# Patient Record
Sex: MALE | URBAN - METROPOLITAN AREA
[De-identification: names, ages, dates, MRNs, and addresses within clinical notes are randomized per-mention and may not be internally consistent; named-entity substitution may affect disease eponyms.]

---

## 2017-04-04 DIAGNOSIS — E11.9 CONTROLLED TYPE 2 DIABETES MELLITUS WITHOUT COMPLICATION, WITHOUT LONG-TERM CURRENT USE OF INSULIN (HCC): ICD-10-CM

## 2017-04-04 DIAGNOSIS — Z00.00 HEALTH CARE MAINTENANCE: Primary | ICD-10-CM

## 2017-04-04 RX ORDER — LISINOPRIL AND HYDROCHLOROTHIAZIDE 12.5; 1 MG/1; MG/1
TABLET ORAL
Qty: 90 TABLET | Refills: 0 | Status: SHIPPED | OUTPATIENT
Start: 2017-04-04 | End: 2017-07-03

## 2017-04-04 RX ORDER — ATENOLOL 25 MG/1
TABLET ORAL
Qty: 90 TABLET | Refills: 0 | Status: SHIPPED | OUTPATIENT
Start: 2017-04-04 | End: 2017-07-03

## 2017-04-15 ENCOUNTER — OFFICE VISIT (OUTPATIENT)
Dept: FAMILY MEDICINE CLINIC | Facility: CLINIC | Age: 57
End: 2017-04-15

## 2017-04-15 VITALS
DIASTOLIC BLOOD PRESSURE: 98 MMHG | HEIGHT: 70.25 IN | HEART RATE: 76 BPM | TEMPERATURE: 99 F | SYSTOLIC BLOOD PRESSURE: 158 MMHG | BODY MASS INDEX: 37.37 KG/M2 | WEIGHT: 261 LBS | RESPIRATION RATE: 16 BRPM

## 2017-04-15 DIAGNOSIS — L30.9 DERMATITIS: Primary | ICD-10-CM

## 2017-04-15 PROCEDURE — 99214 OFFICE O/P EST MOD 30 MIN: CPT | Performed by: FAMILY MEDICINE

## 2017-04-15 RX ORDER — PREDNISONE 10 MG/1
TABLET ORAL
Qty: 30 TABLET | Refills: 0 | Status: SHIPPED | OUTPATIENT
Start: 2017-04-15 | End: 2017-08-19 | Stop reason: ALTCHOICE

## 2017-04-15 RX ORDER — IBUPROFEN 200 MG
200 TABLET ORAL EVERY 6 HOURS PRN
COMMUNITY

## 2017-04-15 RX ORDER — CLOBETASOL PROPIONATE 0.5 MG/G
0.05 CREAM TOPICAL 2 TIMES DAILY
COMMUNITY
End: 2017-08-19 | Stop reason: ALTCHOICE

## 2017-04-15 RX ORDER — DIPHENHYDRAMINE HCL 25 MG
25 CAPSULE ORAL EVERY 6 HOURS PRN
COMMUNITY
End: 2017-08-19 | Stop reason: ALTCHOICE

## 2017-04-15 NOTE — PATIENT INSTRUCTIONS
Start tapering dose steroid. - script sent to [facility]     Stop ibuprofen while taking steroid. Tylenol ES  1 - 2 tabs three times a day as needed for pain ( max.  6 per day)

## 2017-04-16 NOTE — PROGRESS NOTES
Chief Complaint:   Patient presents with:  Rash: facial swelling and rash, chills      HPI:   This is a 61year old male coming in for rash on face and scalp   Discomfort   Present for 6 days.    No fever       No results found for this or any previous visi nausea, vomiting, constipation, diarrhea  : denies dysuria, no urinary frequency , no discharge. MUSCULOSKELETAL:  Denies weakness, muscle aches,   NEUROLOGICAL:  Denies headache, dizziness,   HEMATOLOGIC:  Denies bleeding or bruising.   PSYCHIATRIC:  Gladys Lank for 3 days,One tab qd for 3 days. Patient Instructions   Start tapering dose steroid. - script sent to [facility]     Stop ibuprofen while taking steroid. Tylenol ES  1 - 2 tabs three times a day as needed for pain ( max.  6 per day)

## 2017-07-03 ENCOUNTER — TELEPHONE (OUTPATIENT)
Dept: FAMILY MEDICINE CLINIC | Facility: CLINIC | Age: 57
End: 2017-07-03

## 2017-07-03 RX ORDER — LISINOPRIL AND HYDROCHLOROTHIAZIDE 12.5; 1 MG/1; MG/1
TABLET ORAL
Qty: 90 TABLET | Refills: 0 | Status: SHIPPED | OUTPATIENT
Start: 2017-07-03 | End: 2017-09-29

## 2017-07-03 RX ORDER — ATENOLOL 25 MG/1
TABLET ORAL
Qty: 90 TABLET | Refills: 0 | Status: SHIPPED | OUTPATIENT
Start: 2017-07-03 | End: 2017-09-29

## 2017-07-03 NOTE — TELEPHONE ENCOUNTER
Last OV:  4/15/2017  Future Appointments  Date Time Provider Peña Esteban   7/15/2017 8:00 AM REF [place] [facility] Ref Smove, 07/03/17, 3:39 PM

## 2017-07-03 NOTE — TELEPHONE ENCOUNTER
Future Appointments  Date Time Provider Peña Esteban   7/15/2017 8:00 AM REF [place] [facility]     Pt already has appt.

## 2017-07-15 ENCOUNTER — LABORATORY ENCOUNTER (OUTPATIENT)
Dept: LAB | Age: 57
End: 2017-07-15
Attending: FAMILY MEDICINE
Payer: COMMERCIAL

## 2017-07-15 DIAGNOSIS — E11.9 CONTROLLED TYPE 2 DIABETES MELLITUS WITHOUT COMPLICATION, WITHOUT LONG-TERM CURRENT USE OF INSULIN (HCC): ICD-10-CM

## 2017-07-15 DIAGNOSIS — Z00.00 HEALTH CARE MAINTENANCE: ICD-10-CM

## 2017-07-15 LAB
ALBUMIN SERPL-MCNC: 3.4 G/DL (ref 3.5–4.8)
ALP LIVER SERPL-CCNC: 125 U/L (ref 45–117)
ALT SERPL-CCNC: 34 U/L (ref 17–63)
AST SERPL-CCNC: 16 U/L (ref 15–41)
BASOPHILS # BLD AUTO: 0.07 X10(3) UL (ref 0–0.1)
BASOPHILS NFR BLD AUTO: 1.1 %
BILIRUB SERPL-MCNC: 0.7 MG/DL (ref 0.1–2)
BILIRUB UR QL STRIP.AUTO: NEGATIVE
BUN BLD-MCNC: 26 MG/DL (ref 8–20)
CALCIUM BLD-MCNC: 8.9 MG/DL (ref 8.3–10.3)
CHLORIDE: 101 MMOL/L (ref 101–111)
CHOLEST SMN-MCNC: 289 MG/DL (ref ?–200)
CLARITY UR REFRACT.AUTO: CLEAR
CO2: 26 MMOL/L (ref 22–32)
COLOR UR AUTO: YELLOW
COMPLEXED PSA SERPL-MCNC: 1.82 NG/ML (ref 0.01–4)
CREAT BLD-MCNC: 1.03 MG/DL (ref 0.7–1.3)
CREAT UR-SCNC: 89.8 MG/DL
EOSINOPHIL # BLD AUTO: 0.3 X10(3) UL (ref 0–0.3)
EOSINOPHIL NFR BLD AUTO: 4.8 %
ERYTHROCYTE [DISTWIDTH] IN BLOOD BY AUTOMATED COUNT: 12.9 % (ref 11.5–16)
EST. AVERAGE GLUCOSE BLD GHB EST-MCNC: 278 MG/DL (ref 68–126)
GLUCOSE BLD-MCNC: 273 MG/DL (ref 70–99)
GLUCOSE UR STRIP.AUTO-MCNC: >=500 MG/DL
HBA1C MFR BLD HPLC: 11.3 % (ref ?–5.7)
HCT VFR BLD AUTO: 47.2 % (ref 37–53)
HDLC SERPL-MCNC: 57 MG/DL (ref 45–?)
HDLC SERPL: 5.07 {RATIO} (ref ?–4.97)
HGB BLD-MCNC: 15.1 G/DL (ref 13–17)
IMMATURE GRANULOCYTE COUNT: 0.05 X10(3) UL (ref 0–1)
IMMATURE GRANULOCYTE RATIO %: 0.8 %
LDLC SERPL CALC-MCNC: 191 MG/DL (ref ?–130)
LEUKOCYTE ESTERASE UR QL STRIP.AUTO: NEGATIVE
LYMPHOCYTES # BLD AUTO: 1.42 X10(3) UL (ref 0.9–4)
LYMPHOCYTES NFR BLD AUTO: 22.6 %
M PROTEIN MFR SERPL ELPH: 7 G/DL (ref 6.1–8.3)
MCH RBC QN AUTO: 29.7 PG (ref 27–33.2)
MCHC RBC AUTO-ENTMCNC: 32 G/DL (ref 31–37)
MCV RBC AUTO: 92.9 FL (ref 80–99)
MICROALBUMIN UR-MCNC: <0.5 MG/DL
MONOCYTES # BLD AUTO: 0.49 X10(3) UL (ref 0.1–0.6)
MONOCYTES NFR BLD AUTO: 7.8 %
NEUTROPHIL ABS PRELIM: 3.96 X10 (3) UL (ref 1.3–6.7)
NEUTROPHILS # BLD AUTO: 3.96 X10(3) UL (ref 1.3–6.7)
NEUTROPHILS NFR BLD AUTO: 62.9 %
NITRITE UR QL STRIP.AUTO: NEGATIVE
NONHDLC SERPL-MCNC: 232 MG/DL (ref ?–130)
PH UR STRIP.AUTO: 6 [PH] (ref 4.5–8)
PLATELET # BLD AUTO: 238 10(3)UL (ref 150–450)
POTASSIUM SERPL-SCNC: 4.3 MMOL/L (ref 3.6–5.1)
PROT UR STRIP.AUTO-MCNC: NEGATIVE MG/DL
RBC # BLD AUTO: 5.08 X10(6)UL (ref 4.3–5.7)
RBC UR QL AUTO: NEGATIVE
RED CELL DISTRIBUTION WIDTH-SD: 43.9 FL (ref 35.1–46.3)
SODIUM SERPL-SCNC: 138 MMOL/L (ref 136–144)
SP GR UR STRIP.AUTO: 1.03 (ref 1–1.03)
TRIGLYCERIDES: 204 MG/DL (ref ?–150)
TSI SER-ACNC: 2.33 MIU/ML (ref 0.35–5.5)
UROBILINOGEN UR STRIP.AUTO-MCNC: <2 MG/DL
VLDL: 41 MG/DL (ref 5–40)
WBC # BLD AUTO: 6.3 X10(3) UL (ref 4–13)

## 2017-07-15 PROCEDURE — 81003 URINALYSIS AUTO W/O SCOPE: CPT | Performed by: FAMILY MEDICINE

## 2017-07-15 PROCEDURE — 82043 UR ALBUMIN QUANTITATIVE: CPT | Performed by: FAMILY MEDICINE

## 2017-07-15 PROCEDURE — 84153 ASSAY OF PSA TOTAL: CPT | Performed by: FAMILY MEDICINE

## 2017-07-15 PROCEDURE — 80061 LIPID PANEL: CPT | Performed by: FAMILY MEDICINE

## 2017-07-15 PROCEDURE — 36415 COLL VENOUS BLD VENIPUNCTURE: CPT | Performed by: FAMILY MEDICINE

## 2017-07-15 PROCEDURE — 82570 ASSAY OF URINE CREATININE: CPT | Performed by: FAMILY MEDICINE

## 2017-07-15 PROCEDURE — 83036 HEMOGLOBIN GLYCOSYLATED A1C: CPT | Performed by: FAMILY MEDICINE

## 2017-07-15 PROCEDURE — 80050 GENERAL HEALTH PANEL: CPT | Performed by: FAMILY MEDICINE

## 2017-07-17 ENCOUNTER — TELEPHONE (OUTPATIENT)
Dept: FAMILY MEDICINE CLINIC | Facility: CLINIC | Age: 57
End: 2017-07-17

## 2017-07-17 NOTE — TELEPHONE ENCOUNTER
Informed pt of his blood work results. Advised pt to watch his diet and sugar. Schedule appt with Dr. Fredo Michael for 8/19/17 to discuss blood work results. Pt needed a Saturday and a 8:00 appt. Pt expressed understanding and thanks.

## 2017-07-17 NOTE — TELEPHONE ENCOUNTER
----- Message from Miles Hma MD sent at 7/17/2017  7:03 AM CDT -----  Labs are normal with exception of an elevated cholesterol, FBS of 273 and an A1c of 11.3. The A1c of 11.3 indicates very poorly controlled diabetes.   Patient needs an appointment

## 2017-08-19 ENCOUNTER — OFFICE VISIT (OUTPATIENT)
Dept: FAMILY MEDICINE CLINIC | Facility: CLINIC | Age: 57
End: 2017-08-19

## 2017-08-19 VITALS
WEIGHT: 263.13 LBS | TEMPERATURE: 97 F | RESPIRATION RATE: 12 BRPM | BODY MASS INDEX: 38.97 KG/M2 | SYSTOLIC BLOOD PRESSURE: 142 MMHG | DIASTOLIC BLOOD PRESSURE: 98 MMHG | HEART RATE: 78 BPM | HEIGHT: 69 IN

## 2017-08-19 DIAGNOSIS — E11.65 UNCONTROLLED TYPE 2 DIABETES MELLITUS WITH COMPLICATION, WITHOUT LONG-TERM CURRENT USE OF INSULIN (HCC): Primary | ICD-10-CM

## 2017-08-19 DIAGNOSIS — E11.8 UNCONTROLLED TYPE 2 DIABETES MELLITUS WITH COMPLICATION, WITHOUT LONG-TERM CURRENT USE OF INSULIN (HCC): Primary | ICD-10-CM

## 2017-08-19 PROCEDURE — 99214 OFFICE O/P EST MOD 30 MIN: CPT | Performed by: FAMILY MEDICINE

## 2017-08-19 NOTE — PATIENT INSTRUCTIONS
Highly recommend weight loss and improved diet along with taking medication for diabetes, cholesterol, and hypertension.   Highly recommend cardiac stress test.

## 2017-08-19 NOTE — PROGRESS NOTES
[facility] [place]  PROGRESS NOTE  Chief Complaint:   Patient presents with:  Lab Results      HPI:   This is a 61year old male coming in for discussion of labs. Patient has a history of hypertension was seen last year for hypertension.   Has n <=30.0 ug/mg   -URINALYSIS, ROUTINE   Result Value Ref Range   Urine Color Yellow Yellow   Clarity Urine Clear Clear   Spec Gravity 1.026 1.001 - 1.030   Glucose Urine >=500 (A) Negative mg/dl   Bilirubin Urine Negative Negative   Ketones Urine Trace (A) N History   Problem Relation Age of Onset   • Cerebral hemorrhage [OTHER] Mother       at 67 of cerebral hemorrhage, had diabetes and CAD   • CAD [OTHER] Father      Other  in his upper [de-identified] with CAD, having CABG at age 80     Allergies:    No Known A Patient is alert, awake and oriented, well developed, well nourished  NECK: Supple, no JVD, no thyromegaly. SKIN: No rashes, no skin lesion, no bruising, good turgor. HEART:  Regular rate and rhythm, no murmurs, rubs or gallops.   LUNGS: Clear to ausculta Eye Exam due on 01/17/1959  Annual Physical due on 01/17/1961  FIT Colorectal Screening due on 01/17/2009  Colonoscopy,10 Years due on 01/17/2009    Patient/Caregiver Education: Patient/Caregiver Education: There are no barriers to learning.  Medical educat

## 2017-09-29 RX ORDER — ATENOLOL 25 MG/1
TABLET ORAL
Qty: 90 TABLET | Refills: 0 | Status: SHIPPED | OUTPATIENT
Start: 2017-09-29 | End: 2017-12-30

## 2017-09-29 RX ORDER — LISINOPRIL AND HYDROCHLOROTHIAZIDE 12.5; 1 MG/1; MG/1
TABLET ORAL
Qty: 90 TABLET | Refills: 0 | Status: SHIPPED | OUTPATIENT
Start: 2017-09-29 | End: 2017-12-30

## 2017-09-29 NOTE — TELEPHONE ENCOUNTER
Future appt:  None  Last Appointment:  8/19/2017; No f/u recommendations    Cholesterol, Total (mg/dL)   Date Value   07/15/2017 289 (H)   ----------  HDL Cholesterol (mg/dL)   Date Value   07/15/2017 57   ----------  LDL Cholesterol (mg/dL)   Date Value

## 2018-01-02 RX ORDER — LISINOPRIL AND HYDROCHLOROTHIAZIDE 12.5; 1 MG/1; MG/1
TABLET ORAL
Qty: 90 TABLET | Refills: 0 | Status: SHIPPED | OUTPATIENT
Start: 2018-01-02 | End: 2018-03-27

## 2018-01-02 RX ORDER — ATENOLOL 25 MG/1
TABLET ORAL
Qty: 90 TABLET | Refills: 0 | Status: SHIPPED | OUTPATIENT
Start: 2018-01-02 | End: 2018-03-27

## 2018-01-02 NOTE — TELEPHONE ENCOUNTER
Future appt:    Last Appointment:  8/19/2017 with Dr. Jadiel Bull for DM    Cholesterol, Total (mg/dL)   Date Value   07/15/2017 289 (H)   ----------  HDL Cholesterol (mg/dL)   Date Value   07/15/2017 57   ----------  LDL Cholesterol (mg/dL)   Date Value   07

## 2018-03-27 RX ORDER — ATENOLOL 25 MG/1
TABLET ORAL
Qty: 90 TABLET | Refills: 0 | Status: SHIPPED | OUTPATIENT
Start: 2018-03-27 | End: 2018-06-29

## 2018-03-27 RX ORDER — LISINOPRIL AND HYDROCHLOROTHIAZIDE 12.5; 1 MG/1; MG/1
TABLET ORAL
Qty: 90 TABLET | Refills: 0 | Status: SHIPPED | OUTPATIENT
Start: 2018-03-27 | End: 2018-06-29

## 2018-03-27 NOTE — TELEPHONE ENCOUNTER
Patient informed He is overdue for an Appt with Dr Yaneli French. Appt given Saturday May 5th 8:30 with Dr Yaneli French. Savage Chavarria, 03/27/18, 10:29 AM    Refill Atenolol 25mg 1 tab po daily #90  Lisinopril/HCTZ 10/12.5mg 1 po daily. #90    Future appt:     Celso Hodgkin

## 2018-03-27 NOTE — TELEPHONE ENCOUNTER
Future appt:    Last Appointment:  Visit date not found    Cholesterol, Total (mg/dL)   Date Value   07/15/2017 289 (H)   ----------  HDL Cholesterol (mg/dL)   Date Value   07/15/2017 57   ----------  LDL Cholesterol (mg/dL)   Date Value   07/15/2017 191 (

## 2018-05-05 ENCOUNTER — APPOINTMENT (OUTPATIENT)
Dept: LAB | Age: 58
End: 2018-05-05
Attending: FAMILY MEDICINE
Payer: COMMERCIAL

## 2018-05-05 ENCOUNTER — OFFICE VISIT (OUTPATIENT)
Dept: FAMILY MEDICINE CLINIC | Facility: CLINIC | Age: 58
End: 2018-05-05

## 2018-05-05 VITALS
SYSTOLIC BLOOD PRESSURE: 148 MMHG | HEIGHT: 69 IN | BODY MASS INDEX: 38.95 KG/M2 | RESPIRATION RATE: 18 BRPM | TEMPERATURE: 97 F | HEART RATE: 78 BPM | DIASTOLIC BLOOD PRESSURE: 98 MMHG | WEIGHT: 263 LBS

## 2018-05-05 DIAGNOSIS — E78.49 FAMILIAL MULTIPLE LIPOPROTEIN-TYPE HYPERLIPIDEMIA: ICD-10-CM

## 2018-05-05 DIAGNOSIS — E11.8 UNCONTROLLED TYPE 2 DIABETES MELLITUS WITH COMPLICATION, WITHOUT LONG-TERM CURRENT USE OF INSULIN (HCC): Primary | ICD-10-CM

## 2018-05-05 DIAGNOSIS — I10 BENIGN ESSENTIAL HYPERTENSION: ICD-10-CM

## 2018-05-05 DIAGNOSIS — E11.65 UNCONTROLLED TYPE 2 DIABETES MELLITUS WITH COMPLICATION, WITHOUT LONG-TERM CURRENT USE OF INSULIN (HCC): Primary | ICD-10-CM

## 2018-05-05 PROCEDURE — 83036 HEMOGLOBIN GLYCOSYLATED A1C: CPT | Performed by: FAMILY MEDICINE

## 2018-05-05 PROCEDURE — 36415 COLL VENOUS BLD VENIPUNCTURE: CPT | Performed by: FAMILY MEDICINE

## 2018-05-05 PROCEDURE — 80048 BASIC METABOLIC PNL TOTAL CA: CPT | Performed by: FAMILY MEDICINE

## 2018-05-05 PROCEDURE — 99213 OFFICE O/P EST LOW 20 MIN: CPT | Performed by: FAMILY MEDICINE

## 2018-05-05 NOTE — PROGRESS NOTES
[address]  PROGRESS NOTE  Chief Complaint:   Patient presents with: Follow - Up: med check      HPI:   This is a 60year old male coming in for renewal of his blood pressure medication.   This is been a difficult patient to deal with as Result Value Ref Range   Microalbumin, Urine <0.50 mg/dL   Creatinine Ur Random 89.80 mg/dL   Malb/Cre Calc  <=30.0 ug/mg   -URINALYSIS, ROUTINE   Result Value Ref Range   Urine Color Yellow Yellow   Clarity Urine Clear Clear   Spec Gravity 1.026 1.001 - Alcohol use: Yes           0.0 oz/week     Comment: Twice a month a glass of wine    Family History:  Family History   Problem Relation Age of Onset   • Cerebral hemorrhage [OTHER] Mother       at 67 of cerebral hemorrhage, had diabetes and CAD   • C long-term current use of insulin (HCC)  -     BASIC METABOLIC PANEL (8); Future  -     HEMOGLOBIN A1C; Future    Benign essential hypertension  -     BASIC METABOLIC PANEL (8);  Future  -     HEMOGLOBIN A1C; Future    Familial multiple lipoprotein-type hype

## 2018-05-07 ENCOUNTER — TELEPHONE (OUTPATIENT)
Dept: FAMILY MEDICINE CLINIC | Facility: CLINIC | Age: 58
End: 2018-05-07

## 2018-05-07 NOTE — TELEPHONE ENCOUNTER
Patient notified of results and recommendations and expressed understanding.   Discussed with patient what an Endocrinologist is  Patient advised he just needs to let us know what he wants to do  Patient states \"OK\" and hung up    Ky Cerda, 05/07/18,

## 2018-05-07 NOTE — TELEPHONE ENCOUNTER
----- Message from Deana Ocampo MD sent at 5/7/2018  6:52 AM CDT -----  Glucose is 303 with an A1c of 10.7 which indicates uncontrolled diabetes.   I recommend either starting medication or seeing endocrinologist.  Patient has refused to start any diabe

## 2018-06-29 ENCOUNTER — TELEPHONE (OUTPATIENT)
Dept: FAMILY MEDICINE CLINIC | Facility: CLINIC | Age: 58
End: 2018-06-29

## 2018-06-29 RX ORDER — LISINOPRIL AND HYDROCHLOROTHIAZIDE 12.5; 1 MG/1; MG/1
TABLET ORAL
Qty: 90 TABLET | Refills: 0 | Status: SHIPPED | OUTPATIENT
Start: 2018-06-29 | End: 2018-09-29

## 2018-06-29 RX ORDER — ATENOLOL 25 MG/1
TABLET ORAL
Qty: 90 TABLET | Refills: 0 | Status: SHIPPED | OUTPATIENT
Start: 2018-06-29 | End: 2018-09-29

## 2018-06-29 NOTE — TELEPHONE ENCOUNTER
Informed wife pt will start on metformin bid and recheck a1c 3 months. Wife expressed understanding and thanks.

## 2018-09-29 DIAGNOSIS — E11.9 CONTROLLED TYPE 2 DIABETES MELLITUS WITHOUT COMPLICATION, WITHOUT LONG-TERM CURRENT USE OF INSULIN (HCC): Primary | ICD-10-CM

## 2018-09-29 NOTE — TELEPHONE ENCOUNTER
Future appt:    Last Appointment:  Visit date not found  Cholesterol, Total (mg/dL)   Date Value   07/15/2017 289 (H)     HDL Cholesterol (mg/dL)   Date Value   07/15/2017 57     LDL Cholesterol (mg/dL)   Date Value   07/15/2017 191 (H)     Triglycerides (

## 2018-10-01 RX ORDER — LISINOPRIL AND HYDROCHLOROTHIAZIDE 12.5; 1 MG/1; MG/1
TABLET ORAL
Qty: 90 TABLET | Refills: 0 | Status: SHIPPED | OUTPATIENT
Start: 2018-10-01 | End: 2019-01-03

## 2018-10-01 RX ORDER — ATENOLOL 25 MG/1
TABLET ORAL
Qty: 90 TABLET | Refills: 0 | Status: SHIPPED | OUTPATIENT
Start: 2018-10-01 | End: 2019-01-03

## 2018-12-01 ENCOUNTER — APPOINTMENT (OUTPATIENT)
Dept: LAB | Age: 58
End: 2018-12-01
Attending: FAMILY MEDICINE
Payer: COMMERCIAL

## 2018-12-01 DIAGNOSIS — E11.9 CONTROLLED TYPE 2 DIABETES MELLITUS WITHOUT COMPLICATION, WITHOUT LONG-TERM CURRENT USE OF INSULIN (HCC): ICD-10-CM

## 2018-12-01 PROCEDURE — 83036 HEMOGLOBIN GLYCOSYLATED A1C: CPT | Performed by: FAMILY MEDICINE

## 2018-12-01 PROCEDURE — 80048 BASIC METABOLIC PNL TOTAL CA: CPT | Performed by: FAMILY MEDICINE

## 2018-12-01 PROCEDURE — 36415 COLL VENOUS BLD VENIPUNCTURE: CPT | Performed by: FAMILY MEDICINE

## 2018-12-03 ENCOUNTER — TELEPHONE (OUTPATIENT)
Dept: FAMILY MEDICINE CLINIC | Facility: CLINIC | Age: 58
End: 2018-12-03

## 2018-12-03 DIAGNOSIS — E11.9 TYPE 2 DIABETES MELLITUS WITHOUT COMPLICATION, WITHOUT LONG-TERM CURRENT USE OF INSULIN (HCC): Primary | ICD-10-CM

## 2018-12-03 NOTE — TELEPHONE ENCOUNTER
Patient informed of below. Expressed understanding. Requesting Referal to Dr. Molly Sue.   Bryon Magaña, 12/03/18, 2:16 PM

## 2018-12-03 NOTE — TELEPHONE ENCOUNTER
----- Message from Myrl Lesch, MD sent at 12/3/2018  6:46 AM CST -----  Glucose is 275 with A1c of 10.7 indicating poor control of diabetes. I highly recommend patient to see endocrinologist recommend Dr. Cornelius Matute locally.   Please notify patient

## 2019-01-03 RX ORDER — LISINOPRIL AND HYDROCHLOROTHIAZIDE 12.5; 1 MG/1; MG/1
TABLET ORAL
Qty: 90 TABLET | Refills: 0 | Status: SHIPPED | OUTPATIENT
Start: 2019-01-03 | End: 2019-03-27

## 2019-01-03 RX ORDER — ATENOLOL 25 MG/1
TABLET ORAL
Qty: 90 TABLET | Refills: 0 | Status: SHIPPED | OUTPATIENT
Start: 2019-01-03 | End: 2019-03-27

## 2019-01-03 NOTE — TELEPHONE ENCOUNTER
Please inform patient that prescription is sent. He is due for follow-up office visit, recommend to schedule appointment.

## 2019-01-03 NOTE — TELEPHONE ENCOUNTER
Future appt:    Last Appointment:  5/5/2018; No f/u recommended    Cholesterol, Total (mg/dL)   Date Value   07/15/2017 289 (H)     HDL Cholesterol (mg/dL)   Date Value   07/15/2017 57     LDL Cholesterol (mg/dL)   Date Value   07/15/2017 191 (H)     Trigl

## 2019-01-07 NOTE — TELEPHONE ENCOUNTER
Patient informed of below.   Patient is still making a decision on new MD.  Jess Dolan, 01/07/19, 11:31 AM

## 2019-03-25 NOTE — TELEPHONE ENCOUNTER
Future appt:    Last Appointment:  5/5/2018 with Dr. Dariel Rivero; No f/u recommended    Cholesterol, Total (mg/dL)   Date Value   07/15/2017 289 (H)     HDL Cholesterol (mg/dL)   Date Value   07/15/2017 57     LDL Cholesterol (mg/dL)   Date Value   07/15/2017

## 2019-03-26 RX ORDER — LISINOPRIL AND HYDROCHLOROTHIAZIDE 12.5; 1 MG/1; MG/1
TABLET ORAL
Qty: 90 TABLET | Refills: 0 | OUTPATIENT
Start: 2019-03-26

## 2019-03-26 RX ORDER — ATENOLOL 25 MG/1
TABLET ORAL
Qty: 90 TABLET | Refills: 0 | OUTPATIENT
Start: 2019-03-26

## 2019-03-27 RX ORDER — LISINOPRIL AND HYDROCHLOROTHIAZIDE 12.5; 1 MG/1; MG/1
TABLET ORAL
Qty: 90 TABLET | Refills: 0 | Status: SHIPPED | OUTPATIENT
Start: 2019-03-27 | End: 2019-07-02

## 2019-03-27 RX ORDER — ATENOLOL 25 MG/1
TABLET ORAL
Qty: 90 TABLET | Refills: 0 | Status: SHIPPED | OUTPATIENT
Start: 2019-03-27 | End: 2019-07-02

## 2019-03-27 NOTE — TELEPHONE ENCOUNTER
Patient informed of the below and scheduled follow up appointment with Dr. Genoveva Rivera. 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 Appointment made. Patient refused to come in prior to that appointment for fasting labs.  States he will c

## 2019-03-27 NOTE — TELEPHONE ENCOUNTER
Future appt:     Your appointments     Date & Time Appointment Department [facility])    Apr 13, 2019  9:45 AM CDT Follow up with Nathalie Clayton MD [address] [place] [address]        Myrna Rutledge, Ru Bolden

## 2019-03-27 NOTE — TELEPHONE ENCOUNTER
Chart reviewed     [facility]       Patient has made appointment - can he come in ahead of time for labs - or can he come in fasting?

## 2019-04-13 ENCOUNTER — OFFICE VISIT (OUTPATIENT)
Dept: FAMILY MEDICINE CLINIC | Facility: CLINIC | Age: 59
End: 2019-04-13
Payer: COMMERCIAL

## 2019-04-13 ENCOUNTER — APPOINTMENT (OUTPATIENT)
Dept: LAB | Age: 59
End: 2019-04-13
Attending: FAMILY MEDICINE
Payer: COMMERCIAL

## 2019-04-13 VITALS
HEART RATE: 78 BPM | OXYGEN SATURATION: 93 % | TEMPERATURE: 98 F | SYSTOLIC BLOOD PRESSURE: 140 MMHG | HEIGHT: 69 IN | BODY MASS INDEX: 40.11 KG/M2 | RESPIRATION RATE: 18 BRPM | WEIGHT: 270.81 LBS | DIASTOLIC BLOOD PRESSURE: 98 MMHG

## 2019-04-13 DIAGNOSIS — E11.65 UNCONTROLLED TYPE 2 DIABETES MELLITUS WITH COMPLICATION, WITHOUT LONG-TERM CURRENT USE OF INSULIN (HCC): Primary | ICD-10-CM

## 2019-04-13 DIAGNOSIS — E11.8 UNCONTROLLED TYPE 2 DIABETES MELLITUS WITH COMPLICATION, WITHOUT LONG-TERM CURRENT USE OF INSULIN (HCC): Primary | ICD-10-CM

## 2019-04-13 DIAGNOSIS — I10 BENIGN ESSENTIAL HYPERTENSION: ICD-10-CM

## 2019-04-13 DIAGNOSIS — E11.8 UNCONTROLLED TYPE 2 DIABETES MELLITUS WITH COMPLICATION, WITHOUT LONG-TERM CURRENT USE OF INSULIN (HCC): ICD-10-CM

## 2019-04-13 DIAGNOSIS — E11.65 UNCONTROLLED TYPE 2 DIABETES MELLITUS WITH COMPLICATION, WITHOUT LONG-TERM CURRENT USE OF INSULIN (HCC): ICD-10-CM

## 2019-04-13 PROCEDURE — 83036 HEMOGLOBIN GLYCOSYLATED A1C: CPT | Performed by: FAMILY MEDICINE

## 2019-04-13 PROCEDURE — 80053 COMPREHEN METABOLIC PANEL: CPT | Performed by: FAMILY MEDICINE

## 2019-04-13 PROCEDURE — 36415 COLL VENOUS BLD VENIPUNCTURE: CPT | Performed by: FAMILY MEDICINE

## 2019-04-13 PROCEDURE — 99214 OFFICE O/P EST MOD 30 MIN: CPT | Performed by: FAMILY MEDICINE

## 2019-04-13 NOTE — PATIENT INSTRUCTIONS
Continue with medications    Check labs today     Anticipate recheck in 3 - 4 months.      Continue with good work with healthy nutrition and regular activity

## 2019-07-02 RX ORDER — LISINOPRIL AND HYDROCHLOROTHIAZIDE 12.5; 1 MG/1; MG/1
TABLET ORAL
Qty: 90 TABLET | Refills: 1 | Status: SHIPPED | OUTPATIENT
Start: 2019-07-02 | End: 2020-01-03

## 2019-07-02 RX ORDER — ATENOLOL 25 MG/1
TABLET ORAL
Qty: 90 TABLET | Refills: 1 | Status: SHIPPED | OUTPATIENT
Start: 2019-07-02 | End: 2020-01-03

## 2019-07-02 NOTE — TELEPHONE ENCOUNTER
Future appt:    Last Appointment:  4/13/2019 establish care; recheck 3-4 months  Cholesterol, Total (mg/dL)   Date Value   07/15/2017 289 (H)     HDL Cholesterol (mg/dL)   Date Value   07/15/2017 57     LDL Cholesterol (mg/dL)   Date Value   07/15/2017 191 (H)     Triglycerides (mg/dL)   Date Value   07/15/2017 204 (H)     Lab Results   Component Value Date     (H) 04/13/2019    A1C 11.2 (H) 04/13/2019     Lab Results   Component Value Date    TSH 2.330 07/15/2017       No follow-ups on file.      Last RF: 3/27/19

## 2020-01-03 ENCOUNTER — TELEPHONE (OUTPATIENT)
Dept: FAMILY MEDICINE CLINIC | Facility: CLINIC | Age: 60
End: 2020-01-03

## 2020-01-03 RX ORDER — ATENOLOL 25 MG/1
TABLET ORAL
Qty: 90 TABLET | Refills: 0 | Status: SHIPPED | OUTPATIENT
Start: 2020-01-03 | End: 2020-03-25

## 2020-01-03 RX ORDER — LISINOPRIL AND HYDROCHLOROTHIAZIDE 12.5; 1 MG/1; MG/1
TABLET ORAL
Qty: 90 TABLET | Refills: 0 | Status: SHIPPED | OUTPATIENT
Start: 2020-01-03 | End: 2020-03-25

## 2020-01-03 NOTE — TELEPHONE ENCOUNTER
Recommend patient go to Emergency Room   Patient may need some testing done promptly that we are not capable of doing in office setting.

## 2020-01-03 NOTE — TELEPHONE ENCOUNTER
Pt states that he feels \"weird, jittery\" today. He checked his bp and it was 180/117. He states that he is lightheaded. He denies any cp/sob or headache. He would like to be seen today. Please advise.

## 2020-01-03 NOTE — TELEPHONE ENCOUNTER
Patient informed of the below and scheduled appointment with Dr. Arvind Buckner. Patient can only come on Saturdays due to work. Patient will come fasting for bloodwork after appointment. Patient only has one tablet left of medications. Please advise.

## 2020-01-03 NOTE — TELEPHONE ENCOUNTER
Patient is due to recheck with Dr. Martita Okeefe and recheck labs.      Future appt:    Last Appointment with provider: 4/13/19 DM f/u; recheck 3-4 months  Last appointment at [facility] [place]:  Visit date not found  Cholesterol, Total (mg/dL)   Date Value   07/15/2017 2

## 2020-01-03 NOTE — TELEPHONE ENCOUNTER
Patient informed of the below. States he got his blood pressure to come down. It is now 155/98 and he feels fine. States he had a lot of coffee this morning and some candy so he feels the elevated blood pressure was a result of that.      Patient ian

## 2020-01-18 ENCOUNTER — OFFICE VISIT (OUTPATIENT)
Dept: FAMILY MEDICINE CLINIC | Facility: CLINIC | Age: 60
End: 2020-01-18
Payer: COMMERCIAL

## 2020-01-18 ENCOUNTER — APPOINTMENT (OUTPATIENT)
Dept: LAB | Age: 60
End: 2020-01-18
Attending: FAMILY MEDICINE
Payer: COMMERCIAL

## 2020-01-18 VITALS
BODY MASS INDEX: 37.37 KG/M2 | HEART RATE: 74 BPM | HEIGHT: 70 IN | WEIGHT: 261 LBS | RESPIRATION RATE: 14 BRPM | DIASTOLIC BLOOD PRESSURE: 86 MMHG | SYSTOLIC BLOOD PRESSURE: 142 MMHG | TEMPERATURE: 98 F

## 2020-01-18 DIAGNOSIS — I10 BENIGN HYPERTENSION: ICD-10-CM

## 2020-01-18 DIAGNOSIS — E11.9 TYPE 2 DIABETES MELLITUS WITHOUT COMPLICATION, WITHOUT LONG-TERM CURRENT USE OF INSULIN (HCC): ICD-10-CM

## 2020-01-18 DIAGNOSIS — E11.9 TYPE 2 DIABETES MELLITUS WITHOUT COMPLICATION, WITHOUT LONG-TERM CURRENT USE OF INSULIN (HCC): Primary | ICD-10-CM

## 2020-01-18 LAB
ALBUMIN SERPL-MCNC: 3.4 G/DL (ref 3.4–5)
ALBUMIN/GLOB SERPL: 0.8 {RATIO} (ref 1–2)
ALP LIVER SERPL-CCNC: 152 U/L (ref 45–117)
ALT SERPL-CCNC: 30 U/L (ref 16–61)
ANION GAP SERPL CALC-SCNC: 3 MMOL/L (ref 0–18)
AST SERPL-CCNC: 17 U/L (ref 15–37)
BILIRUB SERPL-MCNC: 0.6 MG/DL (ref 0.1–2)
BUN BLD-MCNC: 17 MG/DL (ref 7–18)
BUN/CREAT SERPL: 20.2 (ref 10–20)
CALCIUM BLD-MCNC: 8.8 MG/DL (ref 8.5–10.1)
CHLORIDE SERPL-SCNC: 105 MMOL/L (ref 98–112)
CHOLEST SMN-MCNC: 252 MG/DL (ref ?–200)
CO2 SERPL-SCNC: 28 MMOL/L (ref 21–32)
CREAT BLD-MCNC: 0.84 MG/DL (ref 0.7–1.3)
EST. AVERAGE GLUCOSE BLD GHB EST-MCNC: 295 MG/DL (ref 68–126)
GLOBULIN PLAS-MCNC: 4.2 G/DL (ref 2.8–4.4)
GLUCOSE BLD-MCNC: 259 MG/DL (ref 70–99)
HBA1C MFR BLD HPLC: 11.9 % (ref ?–5.7)
HDLC SERPL-MCNC: 51 MG/DL (ref 40–59)
LDLC SERPL CALC-MCNC: 162 MG/DL (ref ?–100)
M PROTEIN MFR SERPL ELPH: 7.6 G/DL (ref 6.4–8.2)
NONHDLC SERPL-MCNC: 201 MG/DL (ref ?–130)
OSMOLALITY SERPL CALC.SUM OF ELEC: 292 MOSM/KG (ref 275–295)
PATIENT FASTING Y/N/NP: YES
PATIENT FASTING Y/N/NP: YES
POTASSIUM SERPL-SCNC: 4.2 MMOL/L (ref 3.5–5.1)
SODIUM SERPL-SCNC: 136 MMOL/L (ref 136–145)
TRIGL SERPL-MCNC: 197 MG/DL (ref 30–149)
VLDLC SERPL CALC-MCNC: 39 MG/DL (ref 0–30)

## 2020-01-18 PROCEDURE — 80053 COMPREHEN METABOLIC PANEL: CPT | Performed by: FAMILY MEDICINE

## 2020-01-18 PROCEDURE — 83036 HEMOGLOBIN GLYCOSYLATED A1C: CPT | Performed by: FAMILY MEDICINE

## 2020-01-18 PROCEDURE — 99214 OFFICE O/P EST MOD 30 MIN: CPT | Performed by: FAMILY MEDICINE

## 2020-01-18 PROCEDURE — 36415 COLL VENOUS BLD VENIPUNCTURE: CPT | Performed by: FAMILY MEDICINE

## 2020-01-18 PROCEDURE — 80061 LIPID PANEL: CPT | Performed by: FAMILY MEDICINE

## 2020-01-18 NOTE — PATIENT INSTRUCTIONS
Good exam       Obtain labs today     [id number] for refills.    Consider doubling dose of lisinopril       Recheck in 6 months

## 2020-01-21 ENCOUNTER — TELEPHONE (OUTPATIENT)
Dept: FAMILY MEDICINE CLINIC | Facility: CLINIC | Age: 60
End: 2020-01-21

## 2020-01-21 DIAGNOSIS — E11.65 UNCONTROLLED TYPE 2 DIABETES MELLITUS WITH COMPLICATION, WITHOUT LONG-TERM CURRENT USE OF INSULIN (HCC): Primary | ICD-10-CM

## 2020-01-21 DIAGNOSIS — E11.8 UNCONTROLLED TYPE 2 DIABETES MELLITUS WITH COMPLICATION, WITHOUT LONG-TERM CURRENT USE OF INSULIN (HCC): Primary | ICD-10-CM

## 2020-01-21 RX ORDER — GLIMEPIRIDE 2 MG/1
2 TABLET ORAL
Qty: 90 TABLET | Refills: 0 | Status: SHIPPED | OUTPATIENT
Start: 2020-01-21 | End: 2020-03-25

## 2020-01-21 NOTE — TELEPHONE ENCOUNTER
Patient informed of the below results and recommendations from Dr. Chelsea Mae. Patient is agreeable to starting glimepiride and requests a 90 day supply be sent to [facility].      Informed patient I would send in prescription and reminded him that when he is abo

## 2020-01-21 NOTE — TELEPHONE ENCOUNTER
----- Message from Geneva Montaño MD sent at 1/20/2020  4:31 PM CST -----  Labs reviewed. Blood sugar elevated at 259. Hemoglobin A1c at 11.9. Discussed with patient and his evaluation regarding cost of medicines.   Recommend start glimepiride at 2

## 2020-03-25 RX ORDER — LISINOPRIL AND HYDROCHLOROTHIAZIDE 12.5; 1 MG/1; MG/1
TABLET ORAL
Qty: 90 TABLET | Refills: 1 | Status: SHIPPED | OUTPATIENT
Start: 2020-03-25 | End: 2020-09-21

## 2020-03-25 RX ORDER — ATENOLOL 25 MG/1
TABLET ORAL
Qty: 90 TABLET | Refills: 1 | Status: SHIPPED | OUTPATIENT
Start: 2020-03-25 | End: 2020-09-21

## 2020-03-25 RX ORDER — GLIMEPIRIDE 2 MG/1
TABLET ORAL
Qty: 90 TABLET | Refills: 1 | Status: SHIPPED | OUTPATIENT
Start: 2020-03-25 | End: 2020-09-21

## 2020-03-25 NOTE — TELEPHONE ENCOUNTER
Future appt:    Last Appointment with provider:   1/18/2020; Recheck in 6 months     Last appointment at [facility] [place]:  1/18/2020  Cholesterol, Total (mg/dL)   Date Value   01/18/2020 252 (H)     HDL Cholesterol (mg/dL)   Date Value   01/18/2020 51     LDL Cholesterol (mg/dL)   Date Value   01/18/2020 162 (H)     Triglycerides (mg/dL)   Date Value   01/18/2020 197 (H)     Lab Results   Component Value Date     (H) 01/18/2020    A1C 11.9 (H) 01/18/2020     Lab Results   Component Value Date    TSH 2.330 07/15/2017     Last RF:  1/3/2020 and 1/21/2020    No follow-ups on file.

## 2020-09-21 RX ORDER — GLIMEPIRIDE 2 MG/1
TABLET ORAL
Qty: 90 TABLET | Refills: 0 | Status: SHIPPED | OUTPATIENT
Start: 2020-09-21 | End: 2020-12-16

## 2020-09-21 RX ORDER — LISINOPRIL AND HYDROCHLOROTHIAZIDE 12.5; 1 MG/1; MG/1
TABLET ORAL
Qty: 90 TABLET | Refills: 0 | Status: SHIPPED | OUTPATIENT
Start: 2020-09-21 | End: 2020-12-16

## 2020-09-21 RX ORDER — ATENOLOL 25 MG/1
TABLET ORAL
Qty: 90 TABLET | Refills: 0 | Status: SHIPPED | OUTPATIENT
Start: 2020-09-21 | End: 2020-12-16

## 2020-09-21 NOTE — TELEPHONE ENCOUNTER
Please advise refills of Glimepiride 2mg, Lisinopril-hydrochlorothiazide 10-12.5mg, and Atenolol 25mg.     Last Rx: 3/25/20    Future appt:    Last Appointment with provider:   1/18/20 - HTN/DM f/u - per notes recheck in 6 months    Last appointment at [facility]

## 2020-09-21 NOTE — TELEPHONE ENCOUNTER
Dr. Sarah Plaza is out of the office today. Please let patient know that one refill was done, but needs to be seen before more refills. Thank you.

## 2020-09-23 NOTE — TELEPHONE ENCOUNTER
Left detailed message informing patient of the below and asking that he call the office back to schedule.

## 2020-12-16 ENCOUNTER — TELEPHONE (OUTPATIENT)
Dept: FAMILY MEDICINE CLINIC | Facility: CLINIC | Age: 60
End: 2020-12-16

## 2020-12-16 NOTE — TELEPHONE ENCOUNTER
Future appt:     Your appointments     Date & Time Appointment Department [facility])    Jan 25, 2021  8:00 AM CST Exam - Established with Donte Espinoza MD [address] [facility], [facility] ([facility])            [employer]

## 2020-12-16 NOTE — TELEPHONE ENCOUNTER
needs medication refills - will be out on suraj day       Future Appointments   Date Time Provider Peña Esteban   1/25/2021  8:00 AM James Gilbert MD [facility] [place] [facility] [place]

## 2020-12-18 RX ORDER — GLIMEPIRIDE 2 MG/1
2 TABLET ORAL
Qty: 90 TABLET | Refills: 0 | Status: SHIPPED | OUTPATIENT
Start: 2020-12-18 | End: 2021-01-30

## 2020-12-18 RX ORDER — ATENOLOL 25 MG/1
25 TABLET ORAL DAILY
Qty: 90 TABLET | Refills: 0 | Status: SHIPPED | OUTPATIENT
Start: 2020-12-18 | End: 2021-01-25

## 2020-12-18 RX ORDER — LISINOPRIL AND HYDROCHLOROTHIAZIDE 12.5; 1 MG/1; MG/1
1 TABLET ORAL DAILY
Qty: 90 TABLET | Refills: 0 | Status: SHIPPED | OUTPATIENT
Start: 2020-12-18 | End: 2021-03-19

## 2021-01-25 ENCOUNTER — LAB ENCOUNTER (OUTPATIENT)
Dept: LAB | Age: 61
End: 2021-01-25
Attending: FAMILY MEDICINE
Payer: COMMERCIAL

## 2021-01-25 ENCOUNTER — OFFICE VISIT (OUTPATIENT)
Dept: FAMILY MEDICINE CLINIC | Facility: CLINIC | Age: 61
End: 2021-01-25
Payer: COMMERCIAL

## 2021-01-25 VITALS
OXYGEN SATURATION: 96 % | DIASTOLIC BLOOD PRESSURE: 92 MMHG | BODY MASS INDEX: 39.78 KG/M2 | TEMPERATURE: 98 F | HEIGHT: 70.5 IN | WEIGHT: 281 LBS | HEART RATE: 105 BPM | SYSTOLIC BLOOD PRESSURE: 174 MMHG | RESPIRATION RATE: 24 BRPM

## 2021-01-25 DIAGNOSIS — E11.9 TYPE 2 DIABETES MELLITUS WITHOUT COMPLICATION, WITHOUT LONG-TERM CURRENT USE OF INSULIN (HCC): Primary | ICD-10-CM

## 2021-01-25 DIAGNOSIS — R35.1 BPH ASSOCIATED WITH NOCTURIA: ICD-10-CM

## 2021-01-25 DIAGNOSIS — I10 BENIGN ESSENTIAL HYPERTENSION: Primary | ICD-10-CM

## 2021-01-25 DIAGNOSIS — I10 BENIGN ESSENTIAL HYPERTENSION: ICD-10-CM

## 2021-01-25 DIAGNOSIS — E11.8 UNCONTROLLED TYPE 2 DIABETES MELLITUS WITH COMPLICATION, WITHOUT LONG-TERM CURRENT USE OF INSULIN (HCC): ICD-10-CM

## 2021-01-25 DIAGNOSIS — Z00.00 HEALTH CARE MAINTENANCE: ICD-10-CM

## 2021-01-25 DIAGNOSIS — E11.65 UNCONTROLLED TYPE 2 DIABETES MELLITUS WITH COMPLICATION, WITHOUT LONG-TERM CURRENT USE OF INSULIN (HCC): ICD-10-CM

## 2021-01-25 DIAGNOSIS — E78.49 FAMILIAL MULTIPLE LIPOPROTEIN-TYPE HYPERLIPIDEMIA: ICD-10-CM

## 2021-01-25 DIAGNOSIS — N40.1 BPH ASSOCIATED WITH NOCTURIA: ICD-10-CM

## 2021-01-25 LAB
ALBUMIN SERPL-MCNC: 3.5 G/DL (ref 3.4–5)
ALBUMIN/GLOB SERPL: 0.9 {RATIO} (ref 1–2)
ALP LIVER SERPL-CCNC: 182 U/L
ALT SERPL-CCNC: 51 U/L
ANION GAP SERPL CALC-SCNC: 3 MMOL/L (ref 0–18)
AST SERPL-CCNC: 23 U/L (ref 15–37)
BILIRUB SERPL-MCNC: 0.5 MG/DL (ref 0.1–2)
BILIRUB UR QL STRIP.AUTO: NEGATIVE
BUN BLD-MCNC: 20 MG/DL (ref 7–18)
BUN/CREAT SERPL: 20.6 (ref 10–20)
CALCIUM BLD-MCNC: 9 MG/DL (ref 8.5–10.1)
CHLORIDE SERPL-SCNC: 106 MMOL/L (ref 98–112)
CHOLEST SMN-MCNC: 298 MG/DL (ref ?–200)
CLARITY UR REFRACT.AUTO: CLEAR
CO2 SERPL-SCNC: 29 MMOL/L (ref 21–32)
COLOR UR AUTO: YELLOW
COMPLEXED PSA SERPL-MCNC: 1.79 NG/ML (ref ?–4)
CREAT BLD-MCNC: 0.97 MG/DL
EST. AVERAGE GLUCOSE BLD GHB EST-MCNC: 255 MG/DL (ref 68–126)
GLOBULIN PLAS-MCNC: 3.7 G/DL (ref 2.8–4.4)
GLUCOSE BLD-MCNC: 276 MG/DL (ref 70–99)
GLUCOSE UR STRIP.AUTO-MCNC: >=500 MG/DL
HBA1C MFR BLD HPLC: 10.5 % (ref ?–5.7)
HDLC SERPL-MCNC: 55 MG/DL (ref 40–59)
KETONES UR STRIP.AUTO-MCNC: 20 MG/DL
LDLC SERPL CALC-MCNC: 207 MG/DL (ref ?–100)
LEUKOCYTE ESTERASE UR QL STRIP.AUTO: NEGATIVE
M PROTEIN MFR SERPL ELPH: 7.2 G/DL (ref 6.4–8.2)
NITRITE UR QL STRIP.AUTO: NEGATIVE
NONHDLC SERPL-MCNC: 243 MG/DL (ref ?–130)
OSMOLALITY SERPL CALC.SUM OF ELEC: 298 MOSM/KG (ref 275–295)
PATIENT FASTING Y/N/NP: YES
PATIENT FASTING Y/N/NP: YES
PH UR STRIP.AUTO: 5 [PH] (ref 4.5–8)
POTASSIUM SERPL-SCNC: 4.6 MMOL/L (ref 3.5–5.1)
PROT UR STRIP.AUTO-MCNC: NEGATIVE MG/DL
RBC UR QL AUTO: NEGATIVE
SODIUM SERPL-SCNC: 138 MMOL/L (ref 136–145)
SP GR UR STRIP.AUTO: 1.03 (ref 1–1.03)
TRIGL SERPL-MCNC: 178 MG/DL (ref 30–149)
UROBILINOGEN UR STRIP.AUTO-MCNC: <2 MG/DL
VLDLC SERPL CALC-MCNC: 36 MG/DL (ref 0–30)

## 2021-01-25 PROCEDURE — 80053 COMPREHEN METABOLIC PANEL: CPT | Performed by: FAMILY MEDICINE

## 2021-01-25 PROCEDURE — 81003 URINALYSIS AUTO W/O SCOPE: CPT | Performed by: FAMILY MEDICINE

## 2021-01-25 PROCEDURE — 99214 OFFICE O/P EST MOD 30 MIN: CPT | Performed by: FAMILY MEDICINE

## 2021-01-25 PROCEDURE — 84153 ASSAY OF PSA TOTAL: CPT | Performed by: FAMILY MEDICINE

## 2021-01-25 PROCEDURE — 3077F SYST BP >= 140 MM HG: CPT | Performed by: FAMILY MEDICINE

## 2021-01-25 PROCEDURE — 36415 COLL VENOUS BLD VENIPUNCTURE: CPT | Performed by: FAMILY MEDICINE

## 2021-01-25 PROCEDURE — 3080F DIAST BP >= 90 MM HG: CPT | Performed by: FAMILY MEDICINE

## 2021-01-25 PROCEDURE — 3008F BODY MASS INDEX DOCD: CPT | Performed by: FAMILY MEDICINE

## 2021-01-25 PROCEDURE — 80061 LIPID PANEL: CPT | Performed by: FAMILY MEDICINE

## 2021-01-25 PROCEDURE — 83036 HEMOGLOBIN GLYCOSYLATED A1C: CPT | Performed by: FAMILY MEDICINE

## 2021-01-25 RX ORDER — TAMSULOSIN HYDROCHLORIDE 0.4 MG/1
0.4 CAPSULE ORAL NIGHTLY
Qty: 30 CAPSULE | Refills: 3 | Status: SHIPPED | OUTPATIENT
Start: 2021-01-25 | End: 2021-03-19

## 2021-01-25 RX ORDER — ATENOLOL 25 MG/1
25 TABLET ORAL 2 TIMES DAILY
Qty: 180 TABLET | Refills: 3 | Status: SHIPPED | OUTPATIENT
Start: 2021-01-25

## 2021-01-25 NOTE — PATIENT INSTRUCTIONS
Good exam       Check labs today       Continue with regular dosing     Increase atenolol to twice daily       Recheck in 1 year.

## 2021-01-25 NOTE — PROGRESS NOTES
Chief Complaint:   Patient presents with:  Medication Follow-Up  Diabetes  Hypertension      HPI:   This is a 57year old male coming in for follow-up care. Patient is been noncompliant with his diabetes and hypertension.    Blood pressures have been r <130 mg/dL    FASTING Yes        HISTORY:  Past Medical History:   Diagnosis Date   • Diabetes ([facility]    • Hyperlipidemia    • Hypertension    • Uncontrolled type 2 diabetes mellitus with complication, without long-term current use of insulin ([facility] 4/11/201  Denies , fever, chills, or fatigue,     EENT:  Eyes:  Denies eye pain, visual changes,   Ears, Nose, Throat:  Denies hearing loss,or disturbance.  Denies sore throat  INTEGUMENTARY:  Denies rashes, itching.     CARDIOVASCULAR:see HPI   Zion disco hepatosplenomegaly. BACK: No tenderness,FROM.     EXTREMITIES:  No edema, no cyanosis,FROM, 2+ dorsalis pedis pulses bilaterally.   NEURO:  No deficit, normal gait, strength and tone, sensory intact,   PSYCH: no depression or anxiety noted.       ASSESSM type 2 diabetes mellitus with complication, without long-term current use of insulin (HCC)     Family history of ischemic heart disease     Familial multiple lipoprotein-type hyperlipidemia     Benign essential hypertension     BPH associated with nocturia

## 2021-01-30 ENCOUNTER — TELEPHONE (OUTPATIENT)
Dept: FAMILY MEDICINE CLINIC | Facility: CLINIC | Age: 61
End: 2021-01-30

## 2021-01-30 DIAGNOSIS — E11.8 UNCONTROLLED TYPE 2 DIABETES MELLITUS WITH COMPLICATION, WITHOUT LONG-TERM CURRENT USE OF INSULIN (HCC): Primary | ICD-10-CM

## 2021-01-30 DIAGNOSIS — E11.65 UNCONTROLLED TYPE 2 DIABETES MELLITUS WITH COMPLICATION, WITHOUT LONG-TERM CURRENT USE OF INSULIN (HCC): Primary | ICD-10-CM

## 2021-01-30 RX ORDER — GLIMEPIRIDE 2 MG/1
2 TABLET ORAL 2 TIMES DAILY
Qty: 180 TABLET | Refills: 1 | Status: SHIPPED | OUTPATIENT
Start: 2021-01-30 | End: 2021-09-11

## 2021-01-30 NOTE — TELEPHONE ENCOUNTER
Patient informed of the below results and recommendations. Patient declines to f/u labs in 3 months.  Patient states that coming in to recheck his labs more than once a year does not work for his schedule and he does not see the need since his A1C has i

## 2021-01-30 NOTE — TELEPHONE ENCOUNTER
----- Message from Guilherme Stone MD sent at 1/30/2021  9:59 AM CST -----  Lab results reviewed. (Recent office visit)    Hemoglobin A1c at 10.5. Slightly improved from last year at 11.9. Patient with blood sugar at 298, still quite elevated.   And sug

## 2021-03-19 ENCOUNTER — TELEPHONE (OUTPATIENT)
Dept: FAMILY MEDICINE CLINIC | Facility: CLINIC | Age: 61
End: 2021-03-19

## 2021-03-19 RX ORDER — TAMSULOSIN HYDROCHLORIDE 0.4 MG/1
0.4 CAPSULE ORAL NIGHTLY
Qty: 90 CAPSULE | Refills: 2 | Status: SHIPPED | OUTPATIENT
Start: 2021-03-19 | End: 2021-11-29

## 2021-03-19 RX ORDER — LISINOPRIL AND HYDROCHLOROTHIAZIDE 12.5; 1 MG/1; MG/1
1 TABLET ORAL DAILY
Qty: 90 TABLET | Refills: 2 | Status: SHIPPED | OUTPATIENT
Start: 2021-03-19 | End: 2021-11-29

## 2021-03-19 NOTE — TELEPHONE ENCOUNTER
RF Lisinipril  and Flomax     to  [facility]     ( only has 3 left )          No future appointments.

## 2021-03-19 NOTE — TELEPHONE ENCOUNTER
Received incoming fax from [facility] stating that patient would also like a 90 day supply of flomax sent with other medication as the previous flomax script was sent for #30 with 3 refills.      90 day supply of flomax pended along with 90 day supply of lisin

## 2021-09-08 NOTE — TELEPHONE ENCOUNTER
Future appt:    Last Appointment with provider:  1/25/2021; Recheck in 1 year.      Last appointment at [facility]:  Visit date not found  Cholesterol, Total (mg/dL)   Date Value   01/25/2021 298 (H)     HDL Cholesterol (mg/dL)   Date Value   01/25/2021 5

## 2021-09-11 RX ORDER — GLIMEPIRIDE 2 MG/1
2 TABLET ORAL 2 TIMES DAILY
Qty: 180 TABLET | Refills: 1 | Status: SHIPPED | OUTPATIENT
Start: 2021-09-11

## 2021-11-29 RX ORDER — LISINOPRIL AND HYDROCHLOROTHIAZIDE 12.5; 1 MG/1; MG/1
1 TABLET ORAL DAILY
Qty: 90 TABLET | Refills: 0 | Status: SHIPPED | OUTPATIENT
Start: 2021-11-29

## 2021-11-29 RX ORDER — TAMSULOSIN HYDROCHLORIDE 0.4 MG/1
0.4 CAPSULE ORAL NIGHTLY
Qty: 90 CAPSULE | Refills: 0 | Status: SHIPPED | OUTPATIENT
Start: 2021-11-29

## 2021-11-29 NOTE — TELEPHONE ENCOUNTER
Future appt:    Last Appointment with provider: 1/25/21 for annual physical. Return in 1 year.   Last appointment at [facility]:  Visit date not found  Cholesterol, Total (mg/dL)   Date Value   01/25/2021 298 (H)     HDL Cholesterol (mg/dL)   Date Value

## 2022-03-09 RX ORDER — GLIMEPIRIDE 2 MG/1
2 TABLET ORAL 2 TIMES DAILY
Qty: 180 TABLET | Refills: 0 | Status: SHIPPED | OUTPATIENT
Start: 2022-03-09 | End: 2022-03-22 | Stop reason: ALTCHOICE

## 2022-03-09 RX ORDER — LISINOPRIL AND HYDROCHLOROTHIAZIDE 12.5; 1 MG/1; MG/1
1 TABLET ORAL DAILY
Qty: 90 TABLET | Refills: 0 | Status: SHIPPED | OUTPATIENT
Start: 2022-03-09 | End: 2022-03-22

## 2022-03-09 RX ORDER — TAMSULOSIN HYDROCHLORIDE 0.4 MG/1
0.4 CAPSULE ORAL NIGHTLY
Qty: 90 CAPSULE | Refills: 0 | Status: SHIPPED | OUTPATIENT
Start: 2022-03-09

## 2022-03-09 RX ORDER — ATENOLOL 25 MG/1
25 TABLET ORAL 2 TIMES DAILY
Qty: 180 TABLET | Refills: 3 | Status: SHIPPED | OUTPATIENT
Start: 2022-03-09

## 2022-03-09 NOTE — TELEPHONE ENCOUNTER
Future appt: Your appointments     Date & Time Appointment Department [facility])    Apr 13, 2022  6:15 PM CDT Follow Up Visit with Erica Vazquez MD 25 [facility] ([facility])            [address] [facility]  [facility] [id number]  [phone]        Last Appointment with provider:   1/25/21  Diabetes Roger Vo follow up  Last appointment at [facility]:  Visit date not found  Cholesterol, Total (mg/dL)   Date Value   01/25/2021 298 (H)     HDL Cholesterol (mg/dL)   Date Value   01/25/2021 55     LDL Cholesterol (mg/dL)   Date Value   01/25/2021 207 (H)     Triglycerides (mg/dL)   Date Value   01/25/2021 178 (H)     Lab Results   Component Value Date     (H) 01/25/2021    A1C 10.5 (H) 01/25/2021     Lab Results   Component Value Date    TSH 2.330 07/15/2017       No follow-ups on file.

## 2022-03-17 LAB
ALBUMIN: 3.8 G/DL (ref 3.4–5)
ALKALINE PHOSPHATASE: 127 U/L (ref 46–116)
ALT: 36 U/L (ref 10–49)
ANION GAP: 9 MMOL/L (ref 3–11)
AST: 26
BILIRUBIN, TOTAL: 0.8 MG/DL (ref 0.3–1.2)
BUN/CREATININE RATIO: 20
BUN: 15 MG/DL (ref 9–23)
CALCIUM: 9.5 MG/DL (ref 8.7–10.4)
CARBON DIOXIDE: 28 MMOL/L (ref 20–31)
CHLORIDE: 102 MMOL/L (ref 98–107)
CREATININE: 0.74 MG/DL (ref 0.6–1.1)
EGFR IF NONAFRICN AM: >90
GLUCOSE: 295 MG/DL (ref 70–179)
POTASSIUM: 4 MMOL/L (ref 4.5–5.1)
PROTEIN, TOTAL: 7.5 G/DL (ref 5.7–8.2)
SODIUM: 139 MMOL/L (ref 136–145)

## 2022-03-18 LAB
CHOL/HDL RATIO: 6.1 (ref 1–4.5)
ESTIMATED AVERAGE GLUCOSE: 255
FASTING SPECIMEN: YES
HDL CHOLESTEROL: 41 MG/DL (ref 40–60)
HEMOGLOBIN A1C: 10.5 % (ref 4.8–5.6)
LDL CHOLESTEROL: 162 MG/DL (ref 40–100)
NON HDL CHOL: 211 MG/DL (ref 70–130)
TOTAL CHOLESTEROL: 252 MG/DL (ref ?–200)
TRIGLYCERIDES: 246 MG/DL (ref 0–150)
VLDL: 49.2 MG/DL (ref 12–42)

## 2022-03-18 PROCEDURE — 3046F HEMOGLOBIN A1C LEVEL >9.0%: CPT | Performed by: FAMILY MEDICINE

## 2022-03-21 ENCOUNTER — PATIENT OUTREACH (OUTPATIENT)
Dept: CASE MANAGEMENT | Age: 62
End: 2022-03-21

## 2022-03-21 PROCEDURE — 1111F DSCHRG MED/CURRENT MED MERGE: CPT

## 2022-03-22 RX ORDER — HYDROCHLOROTHIAZIDE 12.5 MG/1
12.5 CAPSULE, GELATIN COATED ORAL DAILY
COMMUNITY
Start: 2022-03-19 | End: 2022-04-03

## 2022-03-22 RX ORDER — INSULIN GLARGINE 100 [IU]/ML
30 INJECTION, SOLUTION SUBCUTANEOUS
COMMUNITY
Start: 2022-03-19 | End: 2022-05-08

## 2022-03-22 RX ORDER — ATORVASTATIN CALCIUM 80 MG/1
80 TABLET, FILM COATED ORAL NIGHTLY
COMMUNITY
End: 2022-04-01

## 2022-03-22 RX ORDER — INSULIN LISPRO 100 [IU]/ML
12 INJECTION, SOLUTION INTRAVENOUS; SUBCUTANEOUS 3 TIMES DAILY
COMMUNITY
Start: 2022-03-19 | End: 2022-04-30

## 2022-03-22 RX ORDER — ASPIRIN 81 MG/1
TABLET, CHEWABLE ORAL DAILY
COMMUNITY
End: 2022-04-01

## 2022-03-22 RX ORDER — FOLIC ACID 1 MG/1
1 TABLET ORAL DAILY
COMMUNITY
Start: 2022-03-19 | End: 2023-03-19

## 2022-03-22 RX ORDER — LORATADINE 10 MG/1
10 TABLET ORAL DAILY
COMMUNITY

## 2022-03-22 RX ORDER — LISINOPRIL 20 MG/1
1 TABLET ORAL DAILY
COMMUNITY
Start: 2022-03-19 | End: 2022-04-01

## 2022-03-23 RX ORDER — BLOOD SUGAR DIAGNOSTIC
1 STRIP MISCELLANEOUS 4 TIMES DAILY
COMMUNITY
End: 2022-04-01

## 2022-03-23 RX ORDER — BLOOD-GLUCOSE METER
EACH MISCELLANEOUS
COMMUNITY

## 2022-03-30 ENCOUNTER — OFFICE VISIT (OUTPATIENT)
Dept: FAMILY MEDICINE CLINIC | Facility: CLINIC | Age: 62
End: 2022-03-30
Payer: COMMERCIAL

## 2022-03-30 VITALS
HEART RATE: 75 BPM | TEMPERATURE: 98 F | SYSTOLIC BLOOD PRESSURE: 172 MMHG | OXYGEN SATURATION: 97 % | DIASTOLIC BLOOD PRESSURE: 102 MMHG | RESPIRATION RATE: 20 BRPM | BODY MASS INDEX: 38 KG/M2 | WEIGHT: 267.63 LBS

## 2022-03-30 DIAGNOSIS — I10 PRIMARY HYPERTENSION: ICD-10-CM

## 2022-03-30 DIAGNOSIS — E11.649 UNCONTROLLED TYPE 2 DIABETES MELLITUS WITH HYPOGLYCEMIA WITHOUT COMA (HCC): ICD-10-CM

## 2022-03-30 DIAGNOSIS — I69.351 FLACCID HEMIPLEGIA OF RIGHT DOMINANT SIDE AS LATE EFFECT OF CEREBRAL INFARCTION (HCC): Primary | ICD-10-CM

## 2022-03-30 DIAGNOSIS — IMO0002 UNCONTROLLED TYPE 2 DIABETES MELLITUS WITH COMPLICATION, WITHOUT LONG-TERM CURRENT USE OF INSULIN: ICD-10-CM

## 2022-03-30 DIAGNOSIS — I63.9 STROKE, ACUTE, THROMBOTIC (HCC): ICD-10-CM

## 2022-03-30 DIAGNOSIS — E78.49 FAMILIAL MULTIPLE LIPOPROTEIN-TYPE HYPERLIPIDEMIA: ICD-10-CM

## 2022-03-30 PROCEDURE — 99215 OFFICE O/P EST HI 40 MIN: CPT | Performed by: FAMILY MEDICINE

## 2022-03-30 PROCEDURE — 3080F DIAST BP >= 90 MM HG: CPT | Performed by: FAMILY MEDICINE

## 2022-03-30 PROCEDURE — 3077F SYST BP >= 140 MM HG: CPT | Performed by: FAMILY MEDICINE

## 2022-03-30 NOTE — PATIENT INSTRUCTIONS
[id number] [address] Longoria for refills     Discussed several referrals. Diabetes ed. Endocrinologist.  Neurologist.     PT/OT/speech      DMV handicap tracie.

## 2022-03-31 ENCOUNTER — TELEPHONE (OUTPATIENT)
Dept: FAMILY MEDICINE CLINIC | Facility: CLINIC | Age: 62
End: 2022-03-31

## 2022-03-31 PROBLEM — R20.0 RIGHT SIDED NUMBNESS: Status: ACTIVE | Noted: 2022-03-31

## 2022-03-31 PROBLEM — I63.9 CVA (CEREBRAL VASCULAR ACCIDENT) (HCC): Status: ACTIVE | Noted: 2022-03-18

## 2022-03-31 PROBLEM — R35.1 BPH ASSOCIATED WITH NOCTURIA: Status: RESOLVED | Noted: 2021-01-25 | Resolved: 2022-03-31

## 2022-03-31 PROBLEM — N40.1 BPH ASSOCIATED WITH NOCTURIA: Status: RESOLVED | Noted: 2021-01-25 | Resolved: 2022-03-31

## 2022-03-31 NOTE — TELEPHONE ENCOUNTER
Please return to pool for nurse to forward 3/30 office notes to [facility] when signed.

## 2022-04-01 RX ORDER — BLOOD-GLUCOSE METER
EACH MISCELLANEOUS
Refills: 0 | Status: CANCELLED | OUTPATIENT
Start: 2022-04-01

## 2022-04-01 RX ORDER — LANCETS
EACH MISCELLANEOUS
COMMUNITY
End: 2022-04-01

## 2022-04-01 NOTE — TELEPHONE ENCOUNTER
Spoke to Darnell - pt's daughter listed on his release of information form. She was given names and phone numbers for [facility] and below stated referrals. Follow up appointment scheduled for 4/20/22 at 0830. Darnell will be calling back with medication refills that pt will need.

## 2022-04-01 NOTE — TELEPHONE ENCOUNTER
Office notes signed and then faxed to [facility]. Referrals faxed to Diabetic Education, Endocrinology, and Neurology for this patient.

## 2022-04-01 NOTE — TELEPHONE ENCOUNTER
Most recent labs from 3/18/22 in [address]. Future appt: Your appointments     Date & Time Appointment Department [facility])    Apr 20, 2022  8:30 AM CDT Follow Up Visit with Raj Albright MD 25 [facility] ([facility])            [facility] [id number]  [phone]        Last Appointment with provider:   3/30/2022  Last appointment at [facility]:  3/30/2022  Cholesterol, Total (mg/dL)   Date Value   01/25/2021 298 (H)     HDL Cholesterol (mg/dL)   Date Value   01/25/2021 55     LDL Cholesterol (mg/dL)   Date Value   01/25/2021 207 (H)     Triglycerides (mg/dL)   Date Value   01/25/2021 178 (H)     Lab Results   Component Value Date     (H) 01/25/2021    A1C 10.5 (H) 01/25/2021     Lab Results   Component Value Date    TSH 2.330 07/15/2017       No follow-ups on file.

## 2022-04-01 NOTE — TELEPHONE ENCOUNTER
refills needed that where filled out of state - microlet lancets ndc number [id number], contour next test strips ndc number [id number], lisinopril 20mg 1 tab daily, hydrochlorothiazide 12.5mg 1 cap daily,aspirin 81mg 1 tab daily, atorvastatin 80mg 1 tab every night

## 2022-04-03 RX ORDER — LANCETS
1 EACH MISCELLANEOUS 2 TIMES DAILY
Qty: 200 EACH | Refills: 0 | Status: SHIPPED | OUTPATIENT
Start: 2022-04-03

## 2022-04-03 RX ORDER — ASPIRIN 81 MG/1
81 TABLET, CHEWABLE ORAL DAILY
Qty: 90 TABLET | Refills: 0 | Status: SHIPPED | OUTPATIENT
Start: 2022-04-03

## 2022-04-03 RX ORDER — HYDROCHLOROTHIAZIDE 12.5 MG/1
12.5 CAPSULE, GELATIN COATED ORAL DAILY
Qty: 90 CAPSULE | Refills: 0 | Status: SHIPPED | OUTPATIENT
Start: 2022-04-03 | End: 2022-07-02

## 2022-04-03 RX ORDER — BLOOD SUGAR DIAGNOSTIC
1 STRIP MISCELLANEOUS 4 TIMES DAILY
Qty: 200 EACH | Refills: 0 | Status: SHIPPED | OUTPATIENT
Start: 2022-04-03

## 2022-04-03 RX ORDER — LISINOPRIL 20 MG/1
20 TABLET ORAL DAILY
Qty: 30 TABLET | Refills: 0 | Status: SHIPPED | OUTPATIENT
Start: 2022-04-03 | End: 2022-05-03

## 2022-04-03 RX ORDER — ATORVASTATIN CALCIUM 80 MG/1
80 TABLET, FILM COATED ORAL NIGHTLY
Qty: 90 TABLET | Refills: 0 | Status: SHIPPED | OUTPATIENT
Start: 2022-04-03

## 2022-04-05 ENCOUNTER — TELEPHONE (OUTPATIENT)
Dept: FAMILY MEDICINE CLINIC | Facility: CLINIC | Age: 62
End: 2022-04-05

## 2022-04-05 NOTE — TELEPHONE ENCOUNTER
dr [facility] available not until july 14th, stated if dr butcher calls him direct and explains reason for visit can maybe work him in, please advise

## 2022-04-05 NOTE — TELEPHONE ENCOUNTER
Spoke to pt's daughter Rivka Corona. She informed this RN that she is concerned that pt will not be able to be seen by Endocrinologist Dr. Lauren Hernandez until July 2022. Pt's last HgbA1c is 10.5. Rivka Corona states that if Dr. Echo Hein calls over to the office and speaks with Dr. Lauren Hernandez directly that he might be able to see the patient earlier per staff at Dr. Rand Rueda office.

## 2022-04-05 NOTE — TELEPHONE ENCOUNTER
Left message for return phone call with Dr. Amrik Vora nurse to see if this patient is able to be seen sooner.

## 2022-04-08 ENCOUNTER — TELEPHONE (OUTPATIENT)
Dept: FAMILY MEDICINE CLINIC | Facility: CLINIC | Age: 62
End: 2022-04-08

## 2022-04-08 NOTE — TELEPHONE ENCOUNTER
thought he had algeries, coughed green, now yello/brown. Anything he can take over the counter?   Future Appointments   Date Time Provider Peña Esteban   4/20/2022  8:30 AM Cindy Freeman MD [facility] [place] [facility]

## 2022-04-08 NOTE — TELEPHONE ENCOUNTER
Chart reviewed. Okay to use Robitussin plain. 1 tablet up to 4 times per day as needed for cough. Recommend to drink plenty of water.

## 2022-04-08 NOTE — TELEPHONE ENCOUNTER
Pt's wife is asking for a OTC cough medication for this patient that would be safe to take with his current medications. She gave permission to leave a voicemail with the name of the OTC cough medication. Pt has been coughing x1 week. Initially thought it was allergies. No other symptoms. Pt is staying hydrated. Please advise.

## 2022-04-08 NOTE — TELEPHONE ENCOUNTER
returning your call  Future Appointments   Date Time Provider Peña Esteban   4/20/2022  8:30 AM Tasneem Crouch MD [facility]

## 2022-04-12 ENCOUNTER — TELEPHONE (OUTPATIENT)
Dept: FAMILY MEDICINE CLINIC | Facility: CLINIC | Age: 62
End: 2022-04-12

## 2022-04-12 NOTE — TELEPHONE ENCOUNTER
Pt started home health PT 2x a week for 4 weeks, will re assess after. Speech and occupational therapy was order as well. Would like a call back.

## 2022-04-20 ENCOUNTER — OFFICE VISIT (OUTPATIENT)
Dept: FAMILY MEDICINE CLINIC | Facility: CLINIC | Age: 62
End: 2022-04-20
Payer: COMMERCIAL

## 2022-04-20 ENCOUNTER — TELEPHONE (OUTPATIENT)
Dept: FAMILY MEDICINE CLINIC | Facility: CLINIC | Age: 62
End: 2022-04-20

## 2022-04-20 VITALS
SYSTOLIC BLOOD PRESSURE: 142 MMHG | RESPIRATION RATE: 16 BRPM | OXYGEN SATURATION: 97 % | TEMPERATURE: 98 F | WEIGHT: 265.63 LBS | BODY MASS INDEX: 37.6 KG/M2 | HEIGHT: 70.5 IN | DIASTOLIC BLOOD PRESSURE: 86 MMHG | HEART RATE: 88 BPM

## 2022-04-20 DIAGNOSIS — E11.65 UNCONTROLLED TYPE 2 DIABETES MELLITUS WITH HYPERGLYCEMIA (HCC): ICD-10-CM

## 2022-04-20 DIAGNOSIS — I10 PRIMARY HYPERTENSION: ICD-10-CM

## 2022-04-20 DIAGNOSIS — I63.9 CEREBROVASCULAR ACCIDENT (CVA), UNSPECIFIED MECHANISM (HCC): Primary | ICD-10-CM

## 2022-04-20 PROCEDURE — 3079F DIAST BP 80-89 MM HG: CPT | Performed by: FAMILY MEDICINE

## 2022-04-20 PROCEDURE — 3077F SYST BP >= 140 MM HG: CPT | Performed by: FAMILY MEDICINE

## 2022-04-20 PROCEDURE — 3008F BODY MASS INDEX DOCD: CPT | Performed by: FAMILY MEDICINE

## 2022-04-20 PROCEDURE — 99214 OFFICE O/P EST MOD 30 MIN: CPT | Performed by: FAMILY MEDICINE

## 2022-04-20 NOTE — TELEPHONE ENCOUNTER
spouse called to update medication list for pt- pt had appt today and was to call back with this information

## 2022-04-21 RX ORDER — TAMSULOSIN HYDROCHLORIDE 0.4 MG/1
0.4 CAPSULE ORAL EVERY MORNING
COMMUNITY
Start: 2022-04-21

## 2022-05-09 DIAGNOSIS — I10 PRIMARY HYPERTENSION: ICD-10-CM

## 2022-05-09 NOTE — TELEPHONE ENCOUNTER
Future appt: Your appointments     Date & Time Appointment Department [facility])    Jul 20, 2022  8:30 AM CDT Follow Up Visit with Randolph Christianson MD 25 [facility] ([facility])            25 [facility]  [phone]        Last Appointment with provider:   4/20/2022 for CVA follow up. Last appointment at [facility]:  4/20/2022  Total Cholesterol (mg/dL)   Date Value   03/18/2022 252 (H)     HDL Cholesterol (mg/dL)   Date Value   03/18/2022 41     LDL Cholesterol (mg/dL)   Date Value   03/18/2022 162 (H)     Triglycerides (mg/dL)   Date Value   03/18/2022 246 (H)   01/25/2021 178 (H)     Lab Results   Component Value Date     (H) 01/25/2021    A1C 10.5 (H) 03/18/2022     Lab Results   Component Value Date    TSH 2.330 07/15/2017       No follow-ups on file.

## 2022-05-10 ENCOUNTER — TELEPHONE (OUTPATIENT)
Dept: FAMILY MEDICINE CLINIC | Facility: CLINIC | Age: 62
End: 2022-05-10

## 2022-05-10 RX ORDER — LISINOPRIL 20 MG/1
20 TABLET ORAL DAILY
Qty: 30 TABLET | Refills: 2 | Status: SHIPPED | OUTPATIENT
Start: 2022-05-10

## 2022-05-10 NOTE — TELEPHONE ENCOUNTER
has been discharged from home therapy, order needed to continue outpatient physical & occupational therapy - order can be faxed to [facility] physical therapy [phone]

## 2022-05-19 DIAGNOSIS — I10 PRIMARY HYPERTENSION: ICD-10-CM

## 2022-05-21 RX ORDER — HYDROCHLOROTHIAZIDE 12.5 MG/1
12.5 CAPSULE, GELATIN COATED ORAL DAILY
Qty: 27 CAPSULE | Refills: 1 | Status: SHIPPED | OUTPATIENT
Start: 2022-07-11 | End: 2022-10-09

## 2022-05-21 RX ORDER — ATORVASTATIN CALCIUM 80 MG/1
80 TABLET, FILM COATED ORAL NIGHTLY
Qty: 27 TABLET | Refills: 1 | Status: SHIPPED | OUTPATIENT
Start: 2022-07-11

## 2022-05-23 NOTE — TELEPHONE ENCOUNTER
OK to close encounter. See notes. See refill encounter from 4/1/2022. Patient seen by Dr Susan Somers on 4/20/2022.

## 2022-05-27 ENCOUNTER — TELEPHONE (OUTPATIENT)
Dept: FAMILY MEDICINE CLINIC | Facility: CLINIC | Age: 62
End: 2022-05-27

## 2022-05-27 NOTE — TELEPHONE ENCOUNTER
Received a referral for PT, but patient is doing OT also. Please fax referral for OT to [phone]. Patient started OT on 5/26/2022, so if referral can start that date.

## 2022-05-28 DIAGNOSIS — E11.65 UNCONTROLLED TYPE 2 DIABETES MELLITUS WITH HYPERGLYCEMIA (HCC): ICD-10-CM

## 2022-05-28 DIAGNOSIS — N40.1 BPH ASSOCIATED WITH NOCTURIA: Primary | ICD-10-CM

## 2022-05-28 DIAGNOSIS — R35.1 BPH ASSOCIATED WITH NOCTURIA: Primary | ICD-10-CM

## 2022-05-30 DIAGNOSIS — I10 PRIMARY HYPERTENSION: ICD-10-CM

## 2022-05-31 RX ORDER — LISINOPRIL 20 MG/1
TABLET ORAL
Qty: 30 TABLET | Refills: 2 | OUTPATIENT
Start: 2022-05-31

## 2022-05-31 RX ORDER — GLIMEPIRIDE 2 MG/1
2 TABLET ORAL 2 TIMES DAILY
Qty: 180 TABLET | Refills: 0 | Status: SHIPPED | OUTPATIENT
Start: 2022-05-31

## 2022-05-31 RX ORDER — TAMSULOSIN HYDROCHLORIDE 0.4 MG/1
CAPSULE ORAL
Qty: 90 CAPSULE | Refills: 0 | Status: SHIPPED | OUTPATIENT
Start: 2022-05-31

## 2022-05-31 NOTE — TELEPHONE ENCOUNTER
Future appt: Your appointments     Date & Time Appointment Department [facility])    Jul 22, 2022  3:30 PM CDT Follow Up Visit with [facility] Calvillo [facility]            25 [facility]  [facility] [id number]  [phone]        Last Appointment with provider:   4/20/2022  Last appointment at [facility] [place]:  4/20/2022  Total Cholesterol (mg/dL)   Date Value   03/18/2022 252 (H)     HDL Cholesterol (mg/dL)   Date Value   03/18/2022 41     LDL Cholesterol (mg/dL)   Date Value   03/18/2022 162 (H)     Triglycerides (mg/dL)   Date Value   03/18/2022 246 (H)   01/25/2021 178 (H)     Lab Results   Component Value Date     (H) 01/25/2021    A1C 10.5 (H) 03/18/2022     Lab Results   Component Value Date    TSH 2.330 07/15/2017       No follow-ups on file.

## 2022-05-31 NOTE — TELEPHONE ENCOUNTER
Too soon for medication refill for Lisinopril. Last ordered on 5/10/22 #30, 2 refills. Future appt: Your appointments     Date & Time Appointment Department [facility])    Jul 22, 2022  3:30 PM CDT Follow Up Visit with Mino Sanders MD 25 [facility])            [address] [place] [address], [address] [place] [address] [place]  [place] [address] [id number]  [phone]        Last Appointment with provider:   4/20/2022  Last appointment at [facility] [place]:  4/20/2022  Total Cholesterol (mg/dL)   Date Value   03/18/2022 252 (H)     HDL Cholesterol (mg/dL)   Date Value   03/18/2022 41     LDL Cholesterol (mg/dL)   Date Value   03/18/2022 162 (H)     Triglycerides (mg/dL)   Date Value   03/18/2022 246 (H)   01/25/2021 178 (H)     Lab Results   Component Value Date     (H) 01/25/2021    A1C 10.5 (H) 03/18/2022     Lab Results   Component Value Date    TSH 2.330 07/15/2017       No follow-ups on file.

## 2022-06-06 DIAGNOSIS — E11.649 UNCONTROLLED TYPE 2 DIABETES MELLITUS WITH HYPOGLYCEMIA WITHOUT COMA (HCC): ICD-10-CM

## 2022-06-06 RX ORDER — BLOOD SUGAR DIAGNOSTIC
STRIP MISCELLANEOUS
Qty: 200 STRIP | Refills: 0 | Status: SHIPPED | OUTPATIENT
Start: 2022-06-06

## 2022-06-06 NOTE — TELEPHONE ENCOUNTER
Future appt: Your appointments     Date & Time Appointment Department [facility])    Jul 22, 2022  3:30 PM CDT Follow Up Visit with Fartun Alicea MD 25 [facility] ([facility])            [address] [place] [address] [facility]  [phone]        Last Appointment with provider:   4/20/2022 for CVA follow up. Last appointment at [facility]:  4/20/2022  Total Cholesterol (mg/dL)   Date Value   03/18/2022 252 (H)     HDL Cholesterol (mg/dL)   Date Value   03/18/2022 41     LDL Cholesterol (mg/dL)   Date Value   03/18/2022 162 (H)     Triglycerides (mg/dL)   Date Value   03/18/2022 246 (H)   01/25/2021 178 (H)     Lab Results   Component Value Date     (H) 01/25/2021    A1C 10.5 (H) 03/18/2022     Lab Results   Component Value Date    TSH 2.330 07/15/2017       No follow-ups on file.

## 2022-06-07 ENCOUNTER — TELEPHONE (OUTPATIENT)
Dept: FAMILY MEDICINE CLINIC | Facility: CLINIC | Age: 62
End: 2022-06-07

## 2022-06-07 DIAGNOSIS — R53.1 WEAKNESS: ICD-10-CM

## 2022-06-07 DIAGNOSIS — I69.351 FLACCID HEMIPLEGIA OF RIGHT DOMINANT SIDE AS LATE EFFECT OF CEREBRAL INFARCTION (HCC): ICD-10-CM

## 2022-06-07 DIAGNOSIS — I63.9 CEREBROVASCULAR ACCIDENT (CVA), UNSPECIFIED MECHANISM (HCC): Primary | ICD-10-CM

## 2022-06-07 NOTE — TELEPHONE ENCOUNTER
Pt following Dr. Nery Hook. Pt's spouse, Hank Cruz states that a refill request went through for Glimepiride from this office however pt is no longer on this medication. Donnella Sever that the diabetic refills should come through that provider team as they are managing pt's diabetes. Sachin/Madelaine notified that diabetic medication refills should be sent to above provider.

## 2022-06-07 NOTE — TELEPHONE ENCOUNTER
Pt wife states that they got a refill on pt Glimepiride 20mg, states that she thought the Dr discontinued the medication. States that the last time pt took that medicination was back in march. Wants to know if pt should continue taking it or if it was a mistake by the pharmacy. Would like a call back.

## 2022-06-07 NOTE — TELEPHONE ENCOUNTER
Form faxed.
Had an appointment on May 26th &  also has appointment  today. Questions give them a call.   See note on May 27th  fax # [phone]  Future Appointments   Date Time Provider Peña Esteban   7/22/2022  3:30 PM Nik Sher MD [facility] [place] [facility]
New script placed
Spoke to [facility]. They need new orders for Occupational Therapy. Pt was seen for Occupation Therapy on 5/26/22.
no

## 2022-06-29 PROCEDURE — 3044F HG A1C LEVEL LT 7.0%: CPT | Performed by: FAMILY MEDICINE

## 2022-07-14 ENCOUNTER — TELEPHONE (OUTPATIENT)
Dept: FAMILY MEDICINE CLINIC | Facility: CLINIC | Age: 62
End: 2022-07-14

## 2022-07-14 DIAGNOSIS — E11.649 UNCONTROLLED TYPE 2 DIABETES MELLITUS WITH HYPOGLYCEMIA WITHOUT COMA (HCC): ICD-10-CM

## 2022-07-14 RX ORDER — LANCETS
EACH MISCELLANEOUS
Qty: 200 EACH | Refills: 0 | Status: SHIPPED | OUTPATIENT
Start: 2022-07-14

## 2022-07-14 NOTE — TELEPHONE ENCOUNTER
Future appt: Your appointments     Date & Time Appointment Department [facility])    Jul 22, 2022  3:30 PM CDT Follow Up Visit with Georgi Calhoun MD [address] [place] [address] ([facility])            [address] [place] [address]  [phone]        Last Appointment with provider:   4/20/2022 Follow up visit. Last appointment at [facility]:  4/20/2022  Total Cholesterol (mg/dL)   Date Value   03/18/2022 252 (H)     HDL Cholesterol (mg/dL)   Date Value   03/18/2022 41     LDL Cholesterol (mg/dL)   Date Value   03/18/2022 162 (H)     Triglycerides (mg/dL)   Date Value   03/18/2022 246 (H)   01/25/2021 178 (H)     Lab Results   Component Value Date     (H) 01/25/2021    A1C 10.5 (H) 03/18/2022     Lab Results   Component Value Date    TSH 2.330 07/15/2017       No follow-ups on file.

## 2022-07-19 NOTE — TELEPHONE ENCOUNTER
Pts wife calling and states pt is using a freestyle lavon now and does not need lancets or any other diabetic supplies since Dr. Ky Tian is managing these. Pt would like us to cancel this rx so [facility] will stop contacting them and us for these. Please advise and if you need any info contact Don Malcolm since pt is busy.

## 2022-07-19 NOTE — TELEPHONE ENCOUNTER
Spoke to Ismael Romero from [facility].  Informed her in the future all diabetic supplies should come from pt's endocrinologist.

## 2022-07-22 ENCOUNTER — LAB ENCOUNTER (OUTPATIENT)
Dept: LAB | Age: 62
End: 2022-07-22
Attending: FAMILY MEDICINE
Payer: COMMERCIAL

## 2022-07-22 ENCOUNTER — OFFICE VISIT (OUTPATIENT)
Dept: FAMILY MEDICINE CLINIC | Facility: CLINIC | Age: 62
End: 2022-07-22
Payer: COMMERCIAL

## 2022-07-22 DIAGNOSIS — E11.649 UNCONTROLLED TYPE 2 DIABETES MELLITUS WITH HYPOGLYCEMIA WITHOUT COMA (HCC): ICD-10-CM

## 2022-07-22 DIAGNOSIS — I63.9 CEREBROVASCULAR ACCIDENT (CVA), UNSPECIFIED MECHANISM (HCC): ICD-10-CM

## 2022-07-22 DIAGNOSIS — E11.649 UNCONTROLLED TYPE 2 DIABETES MELLITUS WITH HYPOGLYCEMIA WITHOUT COMA (HCC): Primary | ICD-10-CM

## 2022-07-22 LAB
ALBUMIN SERPL-MCNC: 3.4 G/DL (ref 3.4–5)
ALBUMIN/GLOB SERPL: 0.9 {RATIO} (ref 1–2)
ALP LIVER SERPL-CCNC: 137 U/L
ALT SERPL-CCNC: 29 U/L
ANION GAP SERPL CALC-SCNC: 5 MMOL/L (ref 0–18)
AST SERPL-CCNC: 23 U/L (ref 15–37)
BASOPHILS # BLD AUTO: 0.06 X10(3) UL (ref 0–0.2)
BASOPHILS NFR BLD AUTO: 0.8 %
BILIRUB SERPL-MCNC: 0.4 MG/DL (ref 0.1–2)
BILIRUB UR QL STRIP.AUTO: NEGATIVE
BUN BLD-MCNC: 25 MG/DL (ref 7–18)
CALCIUM BLD-MCNC: 9.4 MG/DL (ref 8.5–10.1)
CHLORIDE SERPL-SCNC: 107 MMOL/L (ref 98–112)
CHOLEST SERPL-MCNC: 131 MG/DL (ref ?–200)
CLARITY UR REFRACT.AUTO: CLEAR
CO2 SERPL-SCNC: 29 MMOL/L (ref 21–32)
COLOR UR AUTO: YELLOW
CREAT BLD-MCNC: 1.09 MG/DL
CREAT UR-SCNC: 150 MG/DL
EOSINOPHIL # BLD AUTO: 0.38 X10(3) UL (ref 0–0.7)
EOSINOPHIL NFR BLD AUTO: 4.9 %
ERYTHROCYTE [DISTWIDTH] IN BLOOD BY AUTOMATED COUNT: 12.7 %
FASTING PATIENT LIPID ANSWER: NO
FASTING STATUS PATIENT QL REPORTED: NO
GLOBULIN PLAS-MCNC: 3.7 G/DL (ref 2.8–4.4)
GLUCOSE BLD-MCNC: 168 MG/DL (ref 70–99)
GLUCOSE UR STRIP.AUTO-MCNC: NEGATIVE MG/DL
HCT VFR BLD AUTO: 42.6 %
HDLC SERPL-MCNC: 48 MG/DL (ref 40–59)
HGB BLD-MCNC: 13.9 G/DL
IMM GRANULOCYTES # BLD AUTO: 0.02 X10(3) UL (ref 0–1)
IMM GRANULOCYTES NFR BLD: 0.3 %
KETONES UR STRIP.AUTO-MCNC: NEGATIVE MG/DL
LDLC SERPL CALC-MCNC: 56 MG/DL (ref ?–100)
LEUKOCYTE ESTERASE UR QL STRIP.AUTO: NEGATIVE
LYMPHOCYTES # BLD AUTO: 1.77 X10(3) UL (ref 1–4)
LYMPHOCYTES NFR BLD AUTO: 22.9 %
MCH RBC QN AUTO: 28.8 PG (ref 26–34)
MCHC RBC AUTO-ENTMCNC: 32.6 G/DL (ref 31–37)
MCV RBC AUTO: 88.2 FL
MICROALBUMIN UR-MCNC: 0.82 MG/DL
MICROALBUMIN/CREAT 24H UR-RTO: 5.5 UG/MG (ref ?–30)
MONOCYTES # BLD AUTO: 0.54 X10(3) UL (ref 0.1–1)
MONOCYTES NFR BLD AUTO: 7 %
NEUTROPHILS # BLD AUTO: 4.97 X10 (3) UL (ref 1.5–7.7)
NEUTROPHILS # BLD AUTO: 4.97 X10(3) UL (ref 1.5–7.7)
NEUTROPHILS NFR BLD AUTO: 64.1 %
NITRITE UR QL STRIP.AUTO: NEGATIVE
NONHDLC SERPL-MCNC: 83 MG/DL (ref ?–130)
OSMOLALITY SERPL CALC.SUM OF ELEC: 300 MOSM/KG (ref 275–295)
PH UR STRIP.AUTO: 6.5 [PH] (ref 5–8)
PLATELET # BLD AUTO: 243 10(3)UL (ref 150–450)
POTASSIUM SERPL-SCNC: 4 MMOL/L (ref 3.5–5.1)
PROT SERPL-MCNC: 7.1 G/DL (ref 6.4–8.2)
PROT UR STRIP.AUTO-MCNC: NEGATIVE MG/DL
RBC # BLD AUTO: 4.83 X10(6)UL
RBC UR QL AUTO: NEGATIVE
SODIUM SERPL-SCNC: 141 MMOL/L (ref 136–145)
SP GR UR STRIP.AUTO: 1.02 (ref 1–1.03)
TRIGL SERPL-MCNC: 163 MG/DL (ref 30–149)
TSI SER-ACNC: 1.65 MIU/ML (ref 0.36–3.74)
UROBILINOGEN UR STRIP.AUTO-MCNC: 0.2 MG/DL
VLDLC SERPL CALC-MCNC: 24 MG/DL (ref 0–30)
WBC # BLD AUTO: 7.7 X10(3) UL (ref 4–11)

## 2022-07-22 PROCEDURE — 36415 COLL VENOUS BLD VENIPUNCTURE: CPT

## 2022-07-22 PROCEDURE — 3079F DIAST BP 80-89 MM HG: CPT | Performed by: FAMILY MEDICINE

## 2022-07-22 PROCEDURE — 99214 OFFICE O/P EST MOD 30 MIN: CPT | Performed by: FAMILY MEDICINE

## 2022-07-22 PROCEDURE — 84443 ASSAY THYROID STIM HORMONE: CPT

## 2022-07-22 PROCEDURE — 81003 URINALYSIS AUTO W/O SCOPE: CPT

## 2022-07-22 PROCEDURE — 80061 LIPID PANEL: CPT

## 2022-07-22 PROCEDURE — 85025 COMPLETE CBC W/AUTO DIFF WBC: CPT

## 2022-07-22 PROCEDURE — 82043 UR ALBUMIN QUANTITATIVE: CPT

## 2022-07-22 PROCEDURE — 3075F SYST BP GE 130 - 139MM HG: CPT | Performed by: FAMILY MEDICINE

## 2022-07-22 PROCEDURE — 3008F BODY MASS INDEX DOCD: CPT | Performed by: FAMILY MEDICINE

## 2022-07-22 PROCEDURE — 3061F NEG MICROALBUMINURIA REV: CPT | Performed by: FAMILY MEDICINE

## 2022-07-22 PROCEDURE — 80053 COMPREHEN METABOLIC PANEL: CPT

## 2022-07-22 PROCEDURE — 82570 ASSAY OF URINE CREATININE: CPT

## 2022-07-23 VITALS
DIASTOLIC BLOOD PRESSURE: 82 MMHG | WEIGHT: 259 LBS | OXYGEN SATURATION: 96 % | HEART RATE: 85 BPM | SYSTOLIC BLOOD PRESSURE: 136 MMHG | TEMPERATURE: 98 F | BODY MASS INDEX: 36.67 KG/M2 | HEIGHT: 70.5 IN | RESPIRATION RATE: 18 BRPM

## 2022-07-24 DIAGNOSIS — I10 PRIMARY HYPERTENSION: ICD-10-CM

## 2022-07-25 RX ORDER — LISINOPRIL 20 MG/1
TABLET ORAL
Qty: 30 TABLET | Refills: 2 | Status: SHIPPED | OUTPATIENT
Start: 2022-07-25

## 2022-07-25 NOTE — TELEPHONE ENCOUNTER
Future appt: Your appointments     Date & Time Appointment Department [facility])    Oct 26, 2022  6:00 PM CDT Physical - Established with Dawson Menard MD [facility] ([facility])            25 [facility] [address] [id number]  [phone]        Last Appointment with provider:   7/22/2022 for diabetes follow up. Last appointment at [facility]:  7/22/2022  Cholesterol, Total (mg/dL)   Date Value   07/22/2022 131     Total Cholesterol (mg/dL)   Date Value   03/18/2022 252 (H)     HDL Cholesterol (mg/dL)   Date Value   07/22/2022 48   03/18/2022 41     LDL Cholesterol (mg/dL)   Date Value   07/22/2022 56   03/18/2022 162 (H)     Triglycerides (mg/dL)   Date Value   07/22/2022 163 (H)   03/18/2022 246 (H)     Lab Results   Component Value Date     (H) 01/25/2021    A1C 6.7 (H) 06/29/2022     Lab Results   Component Value Date    TSH 1.650 07/22/2022       No follow-ups on file.

## 2022-08-09 DIAGNOSIS — I10 PRIMARY HYPERTENSION: ICD-10-CM

## 2022-08-09 RX ORDER — ATORVASTATIN CALCIUM 80 MG/1
80 TABLET, FILM COATED ORAL NIGHTLY
Qty: 90 TABLET | Refills: 3 | Status: SHIPPED | OUTPATIENT
Start: 2022-08-09

## 2022-08-09 RX ORDER — HYDROCHLOROTHIAZIDE 12.5 MG/1
12.5 CAPSULE, GELATIN COATED ORAL DAILY
Qty: 90 CAPSULE | Refills: 3 | Status: SHIPPED | OUTPATIENT
Start: 2022-08-09 | End: 2023-08-04

## 2022-08-09 NOTE — TELEPHONE ENCOUNTER
Pt needs refill on his Atorvastatin 80mg and hydrochlorothiazide 12.5mg 90 day supply for both to [facility].

## 2022-08-09 NOTE — TELEPHONE ENCOUNTER
Future appt: Your appointments     Date & Time Appointment Department [facility])    Oct 26, 2022  6:00 PM CDT Physical - Established with Roseann Romano MD [facility])            25 [facility]  [phone]        Last Appointment with provider:   7/22/2022 with TR for follow up visit. Plan for recheck in 3 months. Last appointment at [facility]:  7/22/2022  Cholesterol, Total (mg/dL)   Date Value   07/22/2022 131     Total Cholesterol (mg/dL)   Date Value   03/18/2022 252 (H)     HDL Cholesterol (mg/dL)   Date Value   07/22/2022 48   03/18/2022 41     LDL Cholesterol (mg/dL)   Date Value   07/22/2022 56   03/18/2022 162 (H)     Triglycerides (mg/dL)   Date Value   07/22/2022 163 (H)   03/18/2022 246 (H)     Lab Results   Component Value Date     (H) 01/25/2021    A1C 6.7 (H) 06/29/2022     Lab Results   Component Value Date    TSH 1.650 07/22/2022       No follow-ups on file.

## 2022-08-10 DIAGNOSIS — N40.1 BPH ASSOCIATED WITH NOCTURIA: ICD-10-CM

## 2022-08-10 DIAGNOSIS — E11.65 UNCONTROLLED TYPE 2 DIABETES MELLITUS WITH HYPERGLYCEMIA (HCC): ICD-10-CM

## 2022-08-10 DIAGNOSIS — R35.1 BPH ASSOCIATED WITH NOCTURIA: ICD-10-CM

## 2022-08-10 RX ORDER — TAMSULOSIN HYDROCHLORIDE 0.4 MG/1
CAPSULE ORAL
Qty: 90 CAPSULE | Refills: 0 | OUTPATIENT
Start: 2022-08-10

## 2022-08-10 RX ORDER — GLIMEPIRIDE 2 MG/1
2 TABLET ORAL 2 TIMES DAILY
Qty: 180 TABLET | Refills: 0 | OUTPATIENT
Start: 2022-08-10

## 2022-08-10 NOTE — TELEPHONE ENCOUNTER
Future appt: Your appointments     Date & Time Appointment Department [facility])    Oct 26, 2022  6:00 PM CDT Physical - Established with Maynor Chirinos MD 1924 [facility] ([facility])            25 [facility], [address] [place] [address]  [phone]        Last Appointment with provider:   7/22/2022 with TR for blood pressure follow up. Plan for recheck in 3 months. Last appointment at [facility]:  7/22/2022  Cholesterol, Total (mg/dL)   Date Value   07/22/2022 131     Total Cholesterol (mg/dL)   Date Value   03/18/2022 252 (H)     HDL Cholesterol (mg/dL)   Date Value   07/22/2022 48   03/18/2022 41     LDL Cholesterol (mg/dL)   Date Value   07/22/2022 56   03/18/2022 162 (H)     Triglycerides (mg/dL)   Date Value   07/22/2022 163 (H)   03/18/2022 246 (H)     Lab Results   Component Value Date     (H) 01/25/2021    A1C 6.7 (H) 06/29/2022     Lab Results   Component Value Date    TSH 1.650 07/22/2022       No follow-ups on file.

## 2022-08-11 RX ORDER — TAMSULOSIN HYDROCHLORIDE 0.4 MG/1
0.4 CAPSULE ORAL NIGHTLY
Qty: 90 CAPSULE | Refills: 3 | Status: SHIPPED | OUTPATIENT
Start: 2022-08-11 | End: 2023-07-24

## 2022-08-16 DIAGNOSIS — I10 PRIMARY HYPERTENSION: ICD-10-CM

## 2022-08-16 RX ORDER — LISINOPRIL 20 MG/1
20 TABLET ORAL DAILY
Qty: 90 TABLET | Refills: 2 | Status: SHIPPED | OUTPATIENT
Start: 2022-08-16

## 2022-08-16 RX ORDER — LISINOPRIL 20 MG/1
20 TABLET ORAL DAILY
Qty: 30 TABLET | Refills: 5 | Status: SHIPPED | OUTPATIENT
Start: 2022-08-16 | End: 2022-08-16

## 2022-08-17 RX ORDER — GLIMEPIRIDE 2 MG/1
TABLET ORAL
Qty: 90 TABLET | Refills: 0 | OUTPATIENT
Start: 2022-08-17

## 2022-08-17 RX ORDER — TAMSULOSIN HYDROCHLORIDE 0.4 MG/1
CAPSULE ORAL
Qty: 30 CAPSULE | Refills: 3 | OUTPATIENT
Start: 2022-08-17

## 2022-08-24 DIAGNOSIS — E11.649 UNCONTROLLED TYPE 2 DIABETES MELLITUS WITH HYPOGLYCEMIA WITHOUT COMA (HCC): ICD-10-CM

## 2022-08-24 RX ORDER — BLOOD SUGAR DIAGNOSTIC
STRIP MISCELLANEOUS
Qty: 200 STRIP | Refills: 0 | OUTPATIENT
Start: 2022-08-24

## 2022-08-24 RX ORDER — LANCETS
EACH MISCELLANEOUS
Refills: 0 | OUTPATIENT
Start: 2022-08-24

## 2022-09-09 ENCOUNTER — TELEPHONE (OUTPATIENT)
Dept: FAMILY MEDICINE CLINIC | Facility: CLINIC | Age: 62
End: 2022-09-09

## 2022-09-09 NOTE — TELEPHONE ENCOUNTER
Please keep the appointment for next week. Dr. Echo Hein will issue a renewal of physical therapy order at that time.

## 2022-09-09 NOTE — TELEPHONE ENCOUNTER
Pt last seen in office per TR on . Pt states he needs an updated order for PT - due to his stroke. Pt has right arm weakness, right leg numbness, and lower back pain and numbness. Pt states his PT order is - pt needs updated PT order (last ordered 22). Pt scheduled for appt for f/u on 22 to further discuss need to continue with PT. Pt will plan to come to appt this week, unless pt states TR can use July visit to issue PT order. Pt goes to [facility] in [place]. Please advise.     Future Appointments   Date Time Provider Peña Okeefeisti   2022  1:30 PM Tahira Oliva MD [facility] [place] [facility] [place]   10/26/2022  6:00 PM Tahira Oliva MD [facility] [place] [facility] [place]

## 2022-09-09 NOTE — TELEPHONE ENCOUNTER
pt has reached his max amt for PT- wants to know if there is anything dr butcher can do to help authorize more visits- pt is still not doing well enough after stroke in march, to stop PT

## 2022-09-14 ENCOUNTER — OFFICE VISIT (OUTPATIENT)
Dept: FAMILY MEDICINE CLINIC | Facility: CLINIC | Age: 62
End: 2022-09-14

## 2022-09-14 VITALS
TEMPERATURE: 98 F | WEIGHT: 259 LBS | HEIGHT: 70.5 IN | HEART RATE: 75 BPM | SYSTOLIC BLOOD PRESSURE: 130 MMHG | BODY MASS INDEX: 36.67 KG/M2 | OXYGEN SATURATION: 96 % | RESPIRATION RATE: 15 BRPM | DIASTOLIC BLOOD PRESSURE: 80 MMHG

## 2022-09-14 DIAGNOSIS — E78.49 FAMILIAL MULTIPLE LIPOPROTEIN-TYPE HYPERLIPIDEMIA: Primary | ICD-10-CM

## 2022-09-14 DIAGNOSIS — I63.9 CEREBROVASCULAR ACCIDENT (CVA), UNSPECIFIED MECHANISM (HCC): ICD-10-CM

## 2022-09-14 DIAGNOSIS — E11.65 UNCONTROLLED TYPE 2 DIABETES MELLITUS WITH HYPERGLYCEMIA (HCC): ICD-10-CM

## 2022-09-14 PROCEDURE — 3079F DIAST BP 80-89 MM HG: CPT | Performed by: FAMILY MEDICINE

## 2022-09-14 PROCEDURE — 3008F BODY MASS INDEX DOCD: CPT | Performed by: FAMILY MEDICINE

## 2022-09-14 PROCEDURE — 3075F SYST BP GE 130 - 139MM HG: CPT | Performed by: FAMILY MEDICINE

## 2022-09-14 PROCEDURE — 99214 OFFICE O/P EST MOD 30 MIN: CPT | Performed by: FAMILY MEDICINE

## 2023-01-17 ENCOUNTER — OFFICE VISIT (OUTPATIENT)
Dept: FAMILY MEDICINE CLINIC | Facility: CLINIC | Age: 63
End: 2023-01-17
Payer: COMMERCIAL

## 2023-01-17 ENCOUNTER — LABORATORY ENCOUNTER (OUTPATIENT)
Dept: LAB | Age: 63
End: 2023-01-17
Attending: FAMILY MEDICINE
Payer: COMMERCIAL

## 2023-01-17 VITALS
OXYGEN SATURATION: 96 % | TEMPERATURE: 98 F | WEIGHT: 261 LBS | HEART RATE: 78 BPM | HEIGHT: 70.5 IN | BODY MASS INDEX: 36.95 KG/M2 | SYSTOLIC BLOOD PRESSURE: 134 MMHG | DIASTOLIC BLOOD PRESSURE: 82 MMHG | RESPIRATION RATE: 14 BRPM

## 2023-01-17 DIAGNOSIS — Z00.00 HEALTH CARE MAINTENANCE: ICD-10-CM

## 2023-01-17 DIAGNOSIS — M25.511 CHRONIC RIGHT SHOULDER PAIN: ICD-10-CM

## 2023-01-17 DIAGNOSIS — E11.9 CONTROLLED TYPE 2 DIABETES MELLITUS WITHOUT COMPLICATION, WITHOUT LONG-TERM CURRENT USE OF INSULIN (HCC): ICD-10-CM

## 2023-01-17 DIAGNOSIS — E78.49 FAMILIAL MULTIPLE LIPOPROTEIN-TYPE HYPERLIPIDEMIA: ICD-10-CM

## 2023-01-17 DIAGNOSIS — I69.351 FLACCID HEMIPLEGIA OF RIGHT DOMINANT SIDE AS LATE EFFECT OF CEREBRAL INFARCTION (HCC): ICD-10-CM

## 2023-01-17 DIAGNOSIS — G89.29 CHRONIC RIGHT SHOULDER PAIN: ICD-10-CM

## 2023-01-17 DIAGNOSIS — Z00.00 HEALTH CARE MAINTENANCE: Primary | ICD-10-CM

## 2023-01-17 LAB
ALBUMIN SERPL-MCNC: 3.3 G/DL (ref 3.4–5)
ALBUMIN/GLOB SERPL: 0.9 {RATIO} (ref 1–2)
ALP LIVER SERPL-CCNC: 132 U/L
ALT SERPL-CCNC: 30 U/L
ANION GAP SERPL CALC-SCNC: 3 MMOL/L (ref 0–18)
AST SERPL-CCNC: 20 U/L (ref 15–37)
BASOPHILS # BLD AUTO: 0.09 X10(3) UL (ref 0–0.2)
BASOPHILS NFR BLD AUTO: 1.3 %
BILIRUB SERPL-MCNC: 0.6 MG/DL (ref 0.1–2)
BUN BLD-MCNC: 16 MG/DL (ref 7–18)
CALCIUM BLD-MCNC: 9.6 MG/DL (ref 8.5–10.1)
CHLORIDE SERPL-SCNC: 104 MMOL/L (ref 98–112)
CHOLEST SERPL-MCNC: 136 MG/DL (ref ?–200)
CO2 SERPL-SCNC: 31 MMOL/L (ref 21–32)
COMPLEXED PSA SERPL-MCNC: 2.26 NG/ML (ref ?–4)
CREAT BLD-MCNC: 1.05 MG/DL
EOSINOPHIL # BLD AUTO: 0.49 X10(3) UL (ref 0–0.7)
EOSINOPHIL NFR BLD AUTO: 7 %
ERYTHROCYTE [DISTWIDTH] IN BLOOD BY AUTOMATED COUNT: 12.7 %
FASTING PATIENT LIPID ANSWER: NO
FASTING STATUS PATIENT QL REPORTED: NO
GFR SERPLBLD BASED ON 1.73 SQ M-ARVRAT: 79 ML/MIN/1.73M2 (ref 60–?)
GLOBULIN PLAS-MCNC: 3.5 G/DL (ref 2.8–4.4)
GLUCOSE BLD-MCNC: 208 MG/DL (ref 70–99)
HCT VFR BLD AUTO: 44.2 %
HDLC SERPL-MCNC: 58 MG/DL (ref 40–59)
HGB BLD-MCNC: 14 G/DL
HGBA1C: 6.4 % (ref 4–5.6)
IMM GRANULOCYTES # BLD AUTO: 0.02 X10(3) UL (ref 0–1)
IMM GRANULOCYTES NFR BLD: 0.3 %
LDLC SERPL CALC-MCNC: 48 MG/DL (ref ?–100)
LYMPHOCYTES # BLD AUTO: 1.77 X10(3) UL (ref 1–4)
LYMPHOCYTES NFR BLD AUTO: 25.2 %
MCH RBC QN AUTO: 29.2 PG (ref 26–34)
MCHC RBC AUTO-ENTMCNC: 31.7 G/DL (ref 31–37)
MCV RBC AUTO: 92.1 FL
MONOCYTES # BLD AUTO: 0.42 X10(3) UL (ref 0.1–1)
MONOCYTES NFR BLD AUTO: 6 %
NEUTROPHILS # BLD AUTO: 4.22 X10 (3) UL (ref 1.5–7.7)
NEUTROPHILS # BLD AUTO: 4.22 X10(3) UL (ref 1.5–7.7)
NEUTROPHILS NFR BLD AUTO: 60.2 %
NONHDLC SERPL-MCNC: 78 MG/DL (ref ?–130)
OSMOLALITY SERPL CALC.SUM OF ELEC: 293 MOSM/KG (ref 275–295)
PLATELET # BLD AUTO: 242 10(3)UL (ref 150–450)
POTASSIUM SERPL-SCNC: 3.9 MMOL/L (ref 3.5–5.1)
PROT SERPL-MCNC: 6.8 G/DL (ref 6.4–8.2)
RBC # BLD AUTO: 4.8 X10(6)UL
SODIUM SERPL-SCNC: 138 MMOL/L (ref 136–145)
TRIGL SERPL-MCNC: 184 MG/DL (ref 30–149)
TSI SER-ACNC: 3.19 MIU/ML (ref 0.36–3.74)
VIT B12 SERPL-MCNC: 922 PG/ML (ref 193–986)
VLDLC SERPL CALC-MCNC: 26 MG/DL (ref 0–30)
WBC # BLD AUTO: 7 X10(3) UL (ref 4–11)

## 2023-01-17 PROCEDURE — 84153 ASSAY OF PSA TOTAL: CPT | Performed by: FAMILY MEDICINE

## 2023-01-17 PROCEDURE — 3075F SYST BP GE 130 - 139MM HG: CPT | Performed by: FAMILY MEDICINE

## 2023-01-17 PROCEDURE — 82607 VITAMIN B-12: CPT | Performed by: FAMILY MEDICINE

## 2023-01-17 PROCEDURE — 80050 GENERAL HEALTH PANEL: CPT | Performed by: FAMILY MEDICINE

## 2023-01-17 PROCEDURE — 80061 LIPID PANEL: CPT | Performed by: FAMILY MEDICINE

## 2023-01-17 PROCEDURE — 3008F BODY MASS INDEX DOCD: CPT | Performed by: FAMILY MEDICINE

## 2023-01-17 PROCEDURE — 99396 PREV VISIT EST AGE 40-64: CPT | Performed by: FAMILY MEDICINE

## 2023-01-17 PROCEDURE — 3079F DIAST BP 80-89 MM HG: CPT | Performed by: FAMILY MEDICINE

## 2023-02-10 RX ORDER — FOLIC ACID 1 MG/1
TABLET ORAL
Qty: 30 TABLET | Refills: 10 | Status: SHIPPED | OUTPATIENT
Start: 2023-02-10

## 2023-02-10 NOTE — TELEPHONE ENCOUNTER
Chart reviewed     Recommend continue with current regimen     I will address duration of taking medication with next [facility] for prescription   Ok for refills

## 2023-02-10 NOTE — TELEPHONE ENCOUNTER
Pt was prescribed this in the hospital with his stroke. Pt was at [address] [place] [address]  in [place], [place]. Hospital has been refilling this since he was admitted March of 2022. Does he need to continue this medication? If so, should this be prescribed or should this be bought over the counter? Future appt:    Last Appointment with provider:   1/17/2023  Last appointment at [facility] [place]:  1/17/2023  Last px: 1/17/2023- recheck in 6 months    Cholesterol, Total (mg/dL)   Date Value   01/17/2023 136     Total Cholesterol (mg/dL)   Date Value   03/18/2022 252 (H)     HDL Cholesterol (mg/dL)   Date Value   01/17/2023 58   03/18/2022 41     LDL Cholesterol (mg/dL)   Date Value   01/17/2023 48   03/18/2022 162 (H)     Triglycerides (mg/dL)   Date Value   01/17/2023 184 (H)   03/18/2022 246 (H)     Lab Results   Component Value Date     (H) 01/25/2021    A1C 6.4 (A) 10/18/2022     Lab Results   Component Value Date    TSH 3.190 01/17/2023       No follow-ups on file.

## 2023-02-23 ENCOUNTER — TELEPHONE (OUTPATIENT)
Dept: FAMILY MEDICINE CLINIC | Facility: CLINIC | Age: 63
End: 2023-02-23

## 2023-02-23 NOTE — TELEPHONE ENCOUNTER
Patient had a negative Cologuard result. A negative Cologuard test result indicates a low likelihood that a colorectal cancer is present. The current Cologuard screening intervals every 3 years.   Recheck in 3 years

## 2023-02-23 NOTE — TELEPHONE ENCOUNTER
Fax received from [employer] 2/23/23. Negative colorguard result. Pt informed of the test results and verbalized understanding.

## 2023-07-19 ENCOUNTER — OFFICE VISIT (OUTPATIENT)
Dept: FAMILY MEDICINE CLINIC | Facility: CLINIC | Age: 63
End: 2023-07-19
Payer: COMMERCIAL

## 2023-07-19 VITALS
TEMPERATURE: 98 F | BODY MASS INDEX: 37.12 KG/M2 | RESPIRATION RATE: 16 BRPM | HEIGHT: 70.5 IN | SYSTOLIC BLOOD PRESSURE: 132 MMHG | WEIGHT: 262.19 LBS | OXYGEN SATURATION: 96 % | HEART RATE: 86 BPM | DIASTOLIC BLOOD PRESSURE: 78 MMHG

## 2023-07-19 DIAGNOSIS — E11.9 CONTROLLED TYPE 2 DIABETES MELLITUS WITHOUT COMPLICATION, WITHOUT LONG-TERM CURRENT USE OF INSULIN (HCC): ICD-10-CM

## 2023-07-19 DIAGNOSIS — I63.9 CEREBROVASCULAR ACCIDENT (CVA), UNSPECIFIED MECHANISM (HCC): ICD-10-CM

## 2023-07-19 DIAGNOSIS — I10 PRIMARY HYPERTENSION: Primary | ICD-10-CM

## 2023-07-19 PROCEDURE — 3008F BODY MASS INDEX DOCD: CPT | Performed by: FAMILY MEDICINE

## 2023-07-19 PROCEDURE — 3075F SYST BP GE 130 - 139MM HG: CPT | Performed by: FAMILY MEDICINE

## 2023-07-19 PROCEDURE — 99214 OFFICE O/P EST MOD 30 MIN: CPT | Performed by: FAMILY MEDICINE

## 2023-07-19 PROCEDURE — 3078F DIAST BP <80 MM HG: CPT | Performed by: FAMILY MEDICINE

## 2023-07-19 RX ORDER — DULAGLUTIDE 1.5 MG/.5ML
1.5 INJECTION, SOLUTION SUBCUTANEOUS
COMMUNITY

## 2023-07-19 RX ORDER — INSULIN ASPART 100 [IU]/ML
20 INJECTION, SOLUTION INTRAVENOUS; SUBCUTANEOUS DAILY
COMMUNITY
Start: 2022-12-27

## 2023-07-19 NOTE — PATIENT INSTRUCTIONS
Good exam      Stop hydrochlorothiazide    Check blood pressure once a month       Increase exercise 5 -6 days / week.        Plan for recheck with physical and labs in January (after January 17 )

## 2023-07-22 DIAGNOSIS — I10 PRIMARY HYPERTENSION: ICD-10-CM

## 2023-07-23 DIAGNOSIS — N40.1 BPH ASSOCIATED WITH NOCTURIA: ICD-10-CM

## 2023-07-23 DIAGNOSIS — R35.1 BPH ASSOCIATED WITH NOCTURIA: ICD-10-CM

## 2023-07-24 RX ORDER — ATORVASTATIN CALCIUM 80 MG/1
80 TABLET, FILM COATED ORAL NIGHTLY
Qty: 90 TABLET | Refills: 1 | Status: SHIPPED | OUTPATIENT
Start: 2023-07-24

## 2023-07-24 RX ORDER — LISINOPRIL 20 MG/1
20 TABLET ORAL DAILY
Qty: 90 TABLET | Refills: 1 | Status: SHIPPED | OUTPATIENT
Start: 2023-07-24

## 2023-07-24 RX ORDER — ATENOLOL 25 MG/1
25 TABLET ORAL 2 TIMES DAILY
Qty: 180 TABLET | Refills: 1 | Status: SHIPPED | OUTPATIENT
Start: 2023-07-24

## 2023-07-24 RX ORDER — TAMSULOSIN HYDROCHLORIDE 0.4 MG/1
0.4 CAPSULE ORAL NIGHTLY
Qty: 90 CAPSULE | Refills: 1 | Status: SHIPPED | OUTPATIENT
Start: 2023-07-24

## 2023-07-24 NOTE — TELEPHONE ENCOUNTER
Last Refill: TAMSULOSIN: 08/11/2022 0.4MG #90 with 3 Refills  ATENOLOL: 02/06/2023 25 MG #180 with 1 Refill  Last Px: 01/17/2023    Recheck in 6 months      Future appt: Your appointments       Date & Time Appointment Department [facility])    Jan 19, 2024  8:00 AM CST Physical - Established with Tahira Oliva MD [address] [facility], [place] [place] ([facility])              [address] [facility], [facility] [place]  [place] [address] [id number]  [phone]          Last Appointment with provider:   7/19/2023  Last appointment at [facility] [place]:  7/19/2023  Cholesterol, Total (mg/dL)   Date Value   01/17/2023 136     Total Cholesterol (mg/dL)   Date Value   03/18/2022 252 (H)     HDL Cholesterol (mg/dL)   Date Value   01/17/2023 58   03/18/2022 41     LDL Cholesterol (mg/dL)   Date Value   01/17/2023 48   03/18/2022 162 (H)     Triglycerides (mg/dL)   Date Value   01/17/2023 184 (H)   03/18/2022 246 (H)     Lab Results   Component Value Date     (H) 01/25/2021    A1C 6.4 (A) 10/18/2022     Lab Results   Component Value Date    TSH 3.190 01/17/2023       No follow-ups on file.

## 2023-07-24 NOTE — TELEPHONE ENCOUNTER
Last refill-8/16/22 20 mg 90 tablet 2 refills lisinopril,   Last refill- 08/09/22 80 mg 90 tablet refills 3  Last physical-01/17/23  F/U instructions- Plan for recheck with physical and labs in January (after January 17 )        Future Appointments   Date Time Provider Peña Esteban   1/19/2024  8:00 AM Geovanny Bone MD [facility] [place] [facility] [place]       Future appt: Your appointments       Date & Time Appointment Department [facility])    Jan 19, 2024  8:00 AM CST Physical - Established with Geovanny Bone MD [address] [facility] ([facility])              [address] [facility], [facility] [place]  [place] [phone]  [phone]          Last Appointment with provider:   7/19/2023  Last appointment at [facility] [place]:  7/19/2023  Cholesterol, Total (mg/dL)   Date Value   01/17/2023 136     Total Cholesterol (mg/dL)   Date Value   03/18/2022 252 (H)     HDL Cholesterol (mg/dL)   Date Value   01/17/2023 58   03/18/2022 41     LDL Cholesterol (mg/dL)   Date Value   01/17/2023 48   03/18/2022 162 (H)     Triglycerides (mg/dL)   Date Value   01/17/2023 184 (H)   03/18/2022 246 (H)     Lab Results   Component Value Date     (H) 01/25/2021    A1C 6.4 (A) 10/18/2022     Lab Results   Component Value Date    TSH 3.190 01/17/2023       No follow-ups on file.

## 2023-07-27 ENCOUNTER — NURSE ONLY (OUTPATIENT)
Dept: FAMILY MEDICINE CLINIC | Facility: CLINIC | Age: 63
End: 2023-07-27
Payer: COMMERCIAL

## 2023-07-27 ENCOUNTER — TELEPHONE (OUTPATIENT)
Dept: FAMILY MEDICINE CLINIC | Facility: CLINIC | Age: 63
End: 2023-07-27

## 2023-07-27 ENCOUNTER — LABORATORY ENCOUNTER (OUTPATIENT)
Dept: LAB | Age: 63
End: 2023-07-27
Attending: FAMILY MEDICINE
Payer: COMMERCIAL

## 2023-07-27 VITALS
DIASTOLIC BLOOD PRESSURE: 92 MMHG | HEART RATE: 89 BPM | TEMPERATURE: 99 F | RESPIRATION RATE: 20 BRPM | OXYGEN SATURATION: 95 % | SYSTOLIC BLOOD PRESSURE: 130 MMHG

## 2023-07-27 DIAGNOSIS — I10 PRIMARY HYPERTENSION: Primary | ICD-10-CM

## 2023-07-27 DIAGNOSIS — E11.65 UNCONTROLLED TYPE 2 DIABETES MELLITUS WITH HYPERGLYCEMIA (HCC): ICD-10-CM

## 2023-07-27 DIAGNOSIS — I10 PRIMARY HYPERTENSION: ICD-10-CM

## 2023-07-27 LAB
ALBUMIN SERPL-MCNC: 3.6 G/DL (ref 3.4–5)
ALBUMIN/GLOB SERPL: 0.9 {RATIO} (ref 1–2)
ALP LIVER SERPL-CCNC: 151 U/L
ALT SERPL-CCNC: 43 U/L
ANION GAP SERPL CALC-SCNC: 3 MMOL/L (ref 0–18)
AST SERPL-CCNC: 19 U/L (ref 15–37)
BASOPHILS # BLD AUTO: 0.07 X10(3) UL (ref 0–0.2)
BASOPHILS NFR BLD AUTO: 1 %
BILIRUB SERPL-MCNC: 0.6 MG/DL (ref 0.1–2)
BILIRUB UR QL STRIP.AUTO: NEGATIVE
BUN BLD-MCNC: 19 MG/DL (ref 7–18)
CALCIUM BLD-MCNC: 9.5 MG/DL (ref 8.5–10.1)
CHLORIDE SERPL-SCNC: 106 MMOL/L (ref 98–112)
CHOLEST SERPL-MCNC: 128 MG/DL (ref ?–200)
CK SERPL-CCNC: 130 U/L
CLARITY UR REFRACT.AUTO: CLEAR
CO2 SERPL-SCNC: 31 MMOL/L (ref 21–32)
COLOR UR AUTO: YELLOW
CREAT BLD-MCNC: 1 MG/DL
CREAT UR-SCNC: 65.2 MG/DL
EGFRCR SERPLBLD CKD-EPI 2021: 84 ML/MIN/1.73M2 (ref 60–?)
EOSINOPHIL # BLD AUTO: 0.21 X10(3) UL (ref 0–0.7)
EOSINOPHIL NFR BLD AUTO: 2.9 %
ERYTHROCYTE [DISTWIDTH] IN BLOOD BY AUTOMATED COUNT: 12.1 %
EST. AVERAGE GLUCOSE BLD GHB EST-MCNC: 151 MG/DL (ref 68–126)
FASTING PATIENT LIPID ANSWER: NO
FASTING STATUS PATIENT QL REPORTED: NO
GLOBULIN PLAS-MCNC: 3.8 G/DL (ref 2.8–4.4)
GLUCOSE BLD-MCNC: 129 MG/DL (ref 70–99)
GLUCOSE UR STRIP.AUTO-MCNC: NEGATIVE MG/DL
HBA1C MFR BLD: 6.9 % (ref ?–5.7)
HCT VFR BLD AUTO: 46.7 %
HDLC SERPL-MCNC: 60 MG/DL (ref 40–59)
HGB BLD-MCNC: 15 G/DL
IMM GRANULOCYTES # BLD AUTO: 0.05 X10(3) UL (ref 0–1)
IMM GRANULOCYTES NFR BLD: 0.7 %
KETONES UR STRIP.AUTO-MCNC: NEGATIVE MG/DL
LDLC SERPL CALC-MCNC: 47 MG/DL (ref ?–100)
LEUKOCYTE ESTERASE UR QL STRIP.AUTO: NEGATIVE
LYMPHOCYTES # BLD AUTO: 1.65 X10(3) UL (ref 1–4)
LYMPHOCYTES NFR BLD AUTO: 22.5 %
MAGNESIUM SERPL-MCNC: 2.3 MG/DL (ref 1.6–2.6)
MCH RBC QN AUTO: 29.8 PG (ref 26–34)
MCHC RBC AUTO-ENTMCNC: 32.1 G/DL (ref 31–37)
MCV RBC AUTO: 92.7 FL
MICROALBUMIN UR-MCNC: 0.55 MG/DL
MICROALBUMIN/CREAT 24H UR-RTO: 8.4 UG/MG (ref ?–30)
MONOCYTES # BLD AUTO: 0.46 X10(3) UL (ref 0.1–1)
MONOCYTES NFR BLD AUTO: 6.3 %
NEUTROPHILS # BLD AUTO: 4.9 X10 (3) UL (ref 1.5–7.7)
NEUTROPHILS # BLD AUTO: 4.9 X10(3) UL (ref 1.5–7.7)
NEUTROPHILS NFR BLD AUTO: 66.6 %
NITRITE UR QL STRIP.AUTO: NEGATIVE
NONHDLC SERPL-MCNC: 68 MG/DL (ref ?–130)
OSMOLALITY SERPL CALC.SUM OF ELEC: 294 MOSM/KG (ref 275–295)
PH UR STRIP.AUTO: 6 [PH] (ref 5–8)
PLATELET # BLD AUTO: 248 10(3)UL (ref 150–450)
POTASSIUM SERPL-SCNC: 4.4 MMOL/L (ref 3.5–5.1)
PROT SERPL-MCNC: 7.4 G/DL (ref 6.4–8.2)
PROT UR STRIP.AUTO-MCNC: NEGATIVE MG/DL
RBC # BLD AUTO: 5.04 X10(6)UL
RBC UR QL AUTO: NEGATIVE
SODIUM SERPL-SCNC: 140 MMOL/L (ref 136–145)
SP GR UR STRIP.AUTO: 1.01 (ref 1–1.03)
TRIGL SERPL-MCNC: 120 MG/DL (ref 30–149)
TSI SER-ACNC: 1.59 MIU/ML (ref 0.36–3.74)
URATE SERPL-MCNC: 4.3 MG/DL
UROBILINOGEN UR STRIP.AUTO-MCNC: <2 MG/DL
VLDLC SERPL CALC-MCNC: 17 MG/DL (ref 0–30)
WBC # BLD AUTO: 7.3 X10(3) UL (ref 4–11)

## 2023-07-27 PROCEDURE — 3061F NEG MICROALBUMINURIA REV: CPT | Performed by: FAMILY MEDICINE

## 2023-07-27 PROCEDURE — 80050 GENERAL HEALTH PANEL: CPT | Performed by: FAMILY MEDICINE

## 2023-07-27 PROCEDURE — 3044F HG A1C LEVEL LT 7.0%: CPT | Performed by: FAMILY MEDICINE

## 2023-07-27 PROCEDURE — 3075F SYST BP GE 130 - 139MM HG: CPT | Performed by: FAMILY MEDICINE

## 2023-07-27 PROCEDURE — 82550 ASSAY OF CK (CPK): CPT | Performed by: FAMILY MEDICINE

## 2023-07-27 PROCEDURE — 84550 ASSAY OF BLOOD/URIC ACID: CPT | Performed by: FAMILY MEDICINE

## 2023-07-27 PROCEDURE — 83735 ASSAY OF MAGNESIUM: CPT | Performed by: FAMILY MEDICINE

## 2023-07-27 PROCEDURE — 82043 UR ALBUMIN QUANTITATIVE: CPT | Performed by: FAMILY MEDICINE

## 2023-07-27 PROCEDURE — 81003 URINALYSIS AUTO W/O SCOPE: CPT | Performed by: FAMILY MEDICINE

## 2023-07-27 PROCEDURE — 3080F DIAST BP >= 90 MM HG: CPT | Performed by: FAMILY MEDICINE

## 2023-07-27 PROCEDURE — 83036 HEMOGLOBIN GLYCOSYLATED A1C: CPT | Performed by: FAMILY MEDICINE

## 2023-07-27 PROCEDURE — 80061 LIPID PANEL: CPT | Performed by: FAMILY MEDICINE

## 2023-07-27 PROCEDURE — 82570 ASSAY OF URINE CREATININE: CPT | Performed by: FAMILY MEDICINE

## 2023-07-27 NOTE — TELEPHONE ENCOUNTER
If any symptoms arise recommend to ER. Have patient come in today for cbc, cmp, lipids, and tsh. And blood pressure by a nurse.

## 2023-07-27 NOTE — PROGRESS NOTES
Patient informed and expressed understanding.      Future Appointments   Date Time Provider Peña Eulalia   7/27/2023  3:30 PM [facility] [place] NURSE [facility] [place] [facility] [place]   7/28/2023 11:40 AM Scott Alvarado MD [facility] [place] [facility] [place]   1/19/2024  8:00 AM Aric Thao MD [facility] [place] [facility] [place]

## 2023-07-27 NOTE — TELEPHONE ENCOUNTER
As below. Patient states since stopping the hydrochlorothiazide, his R extremities have been swollen. State this is the side he had his stroke on 1.5 years ago. States when he makes a fist with both hands, you can really tell a difference. States his R ankle is swollen as well. Patient states he feels fine otherwise. Denies SOB or chest pain. Patient is unable to check his Bp as he is not home. Has not taken it since his appt here. Please advise.

## 2023-07-27 NOTE — TELEPHONE ENCOUNTER
Appts scheduled. Future appt: Your appointments       Date & Time Appointment Department [facility])    Jul 27, 2023  3:15 PM CDT Laboratory Visit with REF 1 Kira Annie Caceres [place] ([facility])        Jul 27, 2023  3:30 PM CDT Nurse Visit with [facility] [place] ([facility])        Jul 28, 2023 11:40 AM CDT Follow up - Extended with MD [facility]ire [facility] ([facility])        Jan 19, 2024  8:00 AM CST Physical - Established with MD Bhargav Charles [place] ([facility])              Annie Nelson [place]  [facility] [place]  [address]  [address] [facility]  [place] [address] [address]  [phone]          Last Appointment with provider:   7/19/2023  Last appointment at [facility] [place]:  7/19/2023  Cholesterol, Total (mg/dL)   Date Value   01/17/2023 136     Total Cholesterol (mg/dL)   Date Value   03/18/2022 252 (H)     HDL Cholesterol (mg/dL)   Date Value   01/17/2023 58   03/18/2022 41     LDL Cholesterol (mg/dL)   Date Value   01/17/2023 48   03/18/2022 162 (H)     Triglycerides (mg/dL)   Date Value   01/17/2023 184 (H)   03/18/2022 246 (H)     Lab Results   Component Value Date     (H) 01/25/2021    A1C 6.4 (A) 10/18/2022     Lab Results   Component Value Date    TSH 3.190 01/17/2023       No follow-ups on file.

## 2023-07-27 NOTE — TELEPHONE ENCOUNTER
Pt has question about a water pill he was taken off of - states that since he's been off it, his hands have been swelling and bit tingly- does not have any more pills, wife threw them out

## 2023-07-27 NOTE — TELEPHONE ENCOUNTER
Patient informed of the below recommendations. States he just took his Bp a few mins ago and it was 148/103. Patient denies chest pain, SOB, dizziness, blurred vision, etc.  States he is feeling fine. Appt scheduled for eval (1st available). Please advise if any recommendations prior to appt.       Future Appointments   Date Time Provider Peña Esteban   7/28/2023 11:40 AM Luciana Krabbe, MD [facility] [place] [facility] [place]   1/19/2024  8:00 AM Lily Suazo MD [facility] [place] [facility] [place]

## 2023-07-28 ENCOUNTER — OFFICE VISIT (OUTPATIENT)
Dept: FAMILY MEDICINE CLINIC | Facility: CLINIC | Age: 63
End: 2023-07-28
Payer: COMMERCIAL

## 2023-07-28 VITALS
WEIGHT: 262 LBS | DIASTOLIC BLOOD PRESSURE: 102 MMHG | OXYGEN SATURATION: 97 % | RESPIRATION RATE: 18 BRPM | TEMPERATURE: 98 F | HEIGHT: 70.5 IN | SYSTOLIC BLOOD PRESSURE: 142 MMHG | BODY MASS INDEX: 37.09 KG/M2 | HEART RATE: 88 BPM

## 2023-07-28 DIAGNOSIS — I10 PRIMARY HYPERTENSION: ICD-10-CM

## 2023-07-28 DIAGNOSIS — B37.2 YEAST DERMATITIS: Primary | ICD-10-CM

## 2023-07-28 PROCEDURE — 3080F DIAST BP >= 90 MM HG: CPT | Performed by: FAMILY MEDICINE

## 2023-07-28 PROCEDURE — 3008F BODY MASS INDEX DOCD: CPT | Performed by: FAMILY MEDICINE

## 2023-07-28 PROCEDURE — 3077F SYST BP >= 140 MM HG: CPT | Performed by: FAMILY MEDICINE

## 2023-07-28 PROCEDURE — 99214 OFFICE O/P EST MOD 30 MIN: CPT | Performed by: FAMILY MEDICINE

## 2023-07-28 RX ORDER — HYDROCHLOROTHIAZIDE 12.5 MG/1
12.5 CAPSULE, GELATIN COATED ORAL DAILY
Qty: 90 CAPSULE | Refills: 3 | Status: SHIPPED | OUTPATIENT
Start: 2023-07-28 | End: 2024-07-22

## 2023-07-28 RX ORDER — NYSTATIN 100000 [USP'U]/G
1 POWDER TOPICAL 4 TIMES DAILY
Qty: 15 G | Refills: 1 | Status: SHIPPED | OUTPATIENT
Start: 2023-07-28

## 2023-07-31 RX ORDER — NYSTATIN 100000 [USP'U]/G
1 POWDER TOPICAL 4 TIMES DAILY
Qty: 15 G | Refills: 1 | OUTPATIENT
Start: 2023-07-31

## 2024-02-19 NOTE — PROGRESS NOTES
[facility]                       [address] [address]     PATIENT NAME:Jil Lennon    YOB: 2002      DATE OF SURGERY: 02/19/2024      SURGEON:  Rl Medrano MD     PREOPERATIVE DIAGNOSIS:  concern for right basal ganglia AVM     POSTOPERATIVE DIAGNOSIS:  no evidence of AVM     PROCEDURE PERFORMED:    Cerebral angiogram with catheter insertion in the following arteries: Left common carotid artery, left internal carotid artery, right common carotid artery, right internal carotid artery, right subclavian artery, right vertebral artery, left subclavian artery  Interpretation of images  Ultrasound-guided right radial artery access  Moderate conscious sedation for 45 minutes     LEVEL OF SEDATION:  Conscious sedation.  Sedation administered by independent   trained observer under attending supervision with continuous monitoring of the   patient's level of consciousness and physiologic status.     TOTAL INTRASERVICE SEDATION TIME:  45 minutes.     COMPLICATIONS:  None.    APPROACH:  Right radial artery      VESSELS SELECTIVELY CATHETERIZED:   Right common carotid artery   Right internal carotid artery   Left common carotid artery  Left internal carotid artery   Left subclavian artery  Right subclavian artery   Right vertebral artery      DEVICES USED:  5 Fr slender sheath  5 Fr Sim 2 catheter  035 glidewire  TR band     INDICATION:  21 y.o. years old female with a history of transient left facial droop and concern for right basal ganglia ischemia vs AVM presents for a cerebral angiogram for evaluation of AVM.    DETAILED DESCRIPTION:      Risks/benefits were thoroughly reviewed with patient/family prior to the procedure.  Risks inclusive but not limited to death, stroke, contrast reaction/nephropathy, access/vascular injury, and other unforeseen risks.  Patient/family provided informed consent.  Patient supine on  Chief Complaint:   Patient presents with:  Medication Follow-Up      HPI:   This is a 60year old male coming in for f/u care. Establishing care - prior patient of dr. Dylan Méndez    Has not been compliant with medications for diabetes.      Patient having pro CONSTITUTIONAL:  Denies , fever, chills, or fatigue,    EENT:  Eyes:  Denies eye pain, visual changes,   Ears, Nose, Throat:  Denies hearing loss,or disturbance. Denies sore throat  INTEGUMENTARY:  Denies rashes, itching.     CARDIOVASCULAR: Denies  chest the angio table, wrist prepped and draped in routine fashion.  Moderate conscious sedation was administered along with local anesthesia.    The right radial artery was sonographically evaluated and judged to be patent.  Real-time ultrasound was used to visualize needle entry into the vessel and a permanent image was stored. An arterial sheath was placed over the wire.  Radial cocktail of 2.5mg verapamil, 200 mcg nitroglycerin and 3500 units of heparin were given through the sheath. Diagnostic catheter telescoping over the 035 glidewire advanced to the arch and double flushed.  Enumerated vessels were then selectively catheterized and angiograms obtained as listed below.  Multiple cervical and intracranial runs were obtained in AP and lateral projection.  The films were reviewed and determined to be of good diagnostic quality.  Diagnostic catheter and sheath were then withdrawn and hemostasis was obtained with above closure device.  No immediate complications were noted.  Patient tolerated the procedure well.    Angiograms performed and findings:    Right radial artery:  Sheath placement in the right radial artery.  There is no evidence of stenosis, dissection, atherosclerosis or contrast extravasation at the site of puncture.  Left common carotid artery - cervical:  Unremarkable left common carotid and carotid bifurcation.  The left internal carotid artery is widely patent distally the left external carotid artery and its branches appear unremarkable.    Left internal carotid artery - cerebral:  The distal cervical, petrous, cavernous, supraclinoid segments of the left ICA appeared patent.  There is a posterior communicating artery supplying the PCA territory.  There is normal flow and branching noted in the left SMILEY and MCA territory.  The capillary and venous phases appear unremarkable.  Left subclavian artery - cervical: Left VA not visualized. Origin is proximal in left subclavian artery.   Right common carotid  artery - cervical:  Unremarkable right common carotid artery and carotid bifurcation.  The internal carotid artery is widely patent distally.  The right external carotid artery and its branches appear unremarkable.    Right internal carotid artery - cerebral:  The distal cervical, petrous, cavernous, supraclinoid segments of the right ICA appear patent.  There is a posterior communicating artery supplying the PCA territory.  There is normal flow and branching noted in right SMILEY and MCA territory.  The capillary and venous phases appear unremarkable. NO evidence of AVM noted.   Right vertebral artery - cerebral:  Right vertebral artery is co-dominant.  Unremarkable distal cervical and intracranial segments of the right vertebral artery.  Right PICA, bilateral AICA, SCA, PCA are visualized.  The basilar artery appears patent. Cross filling of left V4 noted.  There is normal capillary and venous phase.        OVERALL IMPRESSION:  Overall, normal cerebral angiogram  Previously seen concern for right basal ganglia AVM is not seen on this angiogram.         ______________________________  Rl Medrano MD  Vascular and Interventional Neurology     masses, no hepatosplenomegaly. BACK: No tenderness,FROM. EXTREMITIES:  No edema, no cyanosis,FROM, 2+ dorsalis pedis pulses bilaterally. NEURO:  No deficit, normal gait, strength and tone, sensory intact,   PSYCH: no depression or anxiety noted.     AS

## (undated) DIAGNOSIS — I63.9 CEREBROVASCULAR ACCIDENT (CVA), UNSPECIFIED MECHANISM (HCC): Primary | ICD-10-CM

## (undated) DIAGNOSIS — R53.1 WEAKNESS: ICD-10-CM

## (undated) DIAGNOSIS — Z02.9 ENCOUNTERS FOR UNSPECIFIED ADMINISTRATIVE PURPOSE: ICD-10-CM

## (undated) DIAGNOSIS — I10 PRIMARY HYPERTENSION: ICD-10-CM

## (undated) DIAGNOSIS — E11.649 UNCONTROLLED TYPE 2 DIABETES MELLITUS WITH HYPOGLYCEMIA WITHOUT COMA (HCC): Primary | ICD-10-CM

## (undated) DIAGNOSIS — I63.9 CEREBROVASCULAR ACCIDENT (CVA), UNSPECIFIED MECHANISM (HCC): ICD-10-CM

## (undated) NOTE — LETTER
10/31/18        [address] [id number]      Dear Zari Cotter,    [facility] records indicate that you have outstanding lab work and or testing that was ordered for you and has not yet been completed:  Orders Placed This Encounter

## (undated) NOTE — Clinical Note
Pt has [place] set for 3/30/22. Pt is currently in [place] at his Dtr's house. Pt was seen in [facility] at [facility]. Pt reports he is doing okay. Medication list reviewed. Pt is monitoring glucose level and B/P. Pt plans to start ST, OT and PT when he back home. Pt would like to complete his HFU appt prior to starting. Pt has weakness to the right side currently but able to walk. Pt would like to see a DM educator to get a better understanding of how to manage his DM via diet. Also pt needs a recommendation for a Neurologist. Pt was advised to FU with one when he returns. Pt [place] back in [place] by Monday. Thank you.

## (undated) NOTE — MR AVS SNAPSHOT
[address]  [place] [address] [id number]  [phone]               Thank you for choosing us for your health care visit with Renee Beard MD.  We are glad to serve you and happy to provide you with this summary of You can get these medications from any pharmacy     Bring a paper prescription for each of these medications    - predniSONE 10 MG Tabs            [facility]     Sign up for [employer], your secure online medical record.   [employer] will allow you to access patient Start activities slowly and build up over time Do what you like   Get your heart pumping – brisk walking, biking, swimming Even 10 minute increments are effective and add up over the week   2 ½ hours per week – spread out over time Use a justyn to keep you

## (undated) NOTE — Clinical Note
05/04/2017        [address] [id number]      Dear Daniela Márquez records indicate that you have outstanding lab work and or testing that was ordered for you and has not yet been completed:      CBC W Differential W [place]